# Patient Record
Sex: FEMALE | Race: WHITE | NOT HISPANIC OR LATINO | Employment: FULL TIME | URBAN - METROPOLITAN AREA
[De-identification: names, ages, dates, MRNs, and addresses within clinical notes are randomized per-mention and may not be internally consistent; named-entity substitution may affect disease eponyms.]

---

## 2023-09-20 PROBLEM — F54 PSYCHOLOGICAL FACTORS AFFECTING MORBID OBESITY (MULTI): Status: ACTIVE | Noted: 2023-09-20

## 2023-09-20 PROBLEM — Z98.84 BARIATRIC SURGERY STATUS: Status: ACTIVE | Noted: 2023-09-20

## 2023-09-20 PROBLEM — G47.34 NOCTURNAL HYPOXEMIA: Status: ACTIVE | Noted: 2023-09-20

## 2023-09-20 PROBLEM — R91.1 LUNG NODULE, SOLITARY: Status: ACTIVE | Noted: 2023-09-20

## 2023-09-20 PROBLEM — K91.2 INTESTINAL MALABSORPTION FOLLOWING GASTRECTOMY (HHS-HCC): Status: ACTIVE | Noted: 2023-09-20

## 2023-09-20 PROBLEM — B37.31 CANDIDIASIS OF VAGINA: Status: ACTIVE | Noted: 2023-09-20

## 2023-09-20 PROBLEM — J03.00 STREP TONSILLITIS: Status: ACTIVE | Noted: 2023-09-20

## 2023-09-20 PROBLEM — H61.22 LEFT EAR IMPACTED CERUMEN: Status: ACTIVE | Noted: 2023-09-20
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
## 2023-09-20 PROBLEM — B00.9 HSV-2 INFECTION: Status: ACTIVE | Noted: 2023-09-20

## 2023-09-20 PROBLEM — G89.18 ACUTE POST-OPERATIVE PAIN: Status: ACTIVE | Noted: 2023-09-20

## 2023-09-20 PROBLEM — S39.012A STRAIN OF LUMBAR REGION: Status: ACTIVE | Noted: 2023-09-20

## 2023-09-20 PROBLEM — E78.5 HYPERLIPIDEMIA: Status: ACTIVE | Noted: 2023-09-20

## 2023-09-20 PROBLEM — R63.8 ALTERATION IN NUTRITION: Status: ACTIVE | Noted: 2023-09-20

## 2023-09-20 PROBLEM — N92.1 MENORRHAGIA WITH IRREGULAR CYCLE: Status: ACTIVE | Noted: 2023-09-20

## 2023-09-20 PROBLEM — J11.1 FLU SYNDROME: Status: ACTIVE | Noted: 2023-09-20

## 2023-09-20 PROBLEM — E66.01 PSYCHOLOGICAL FACTORS AFFECTING MORBID OBESITY (MULTI): Status: ACTIVE | Noted: 2023-09-20

## 2023-09-20 PROBLEM — R73.01 IMPAIRED FASTING GLUCOSE: Status: ACTIVE | Noted: 2023-09-20

## 2023-09-20 PROBLEM — E88.810 DYSMETABOLIC SYNDROME: Status: ACTIVE | Noted: 2023-09-20

## 2023-09-20 PROBLEM — L30.9 DERMATITIS, ECZEMATOID: Status: ACTIVE | Noted: 2023-09-20

## 2023-09-20 PROBLEM — I10 ESSENTIAL HYPERTENSION: Status: ACTIVE | Noted: 2023-09-20

## 2023-09-20 PROBLEM — S90.121A CONTUSION OF TOE OF RIGHT FOOT: Status: ACTIVE | Noted: 2023-09-20

## 2023-09-20 PROBLEM — R92.1 BREAST CALCIFICATION, RIGHT: Status: ACTIVE | Noted: 2023-09-20

## 2023-09-20 PROBLEM — H81.10 BPPV (BENIGN PAROXYSMAL POSITIONAL VERTIGO): Status: ACTIVE | Noted: 2023-09-20

## 2023-09-20 PROBLEM — R09.1 PLEURISY: Status: ACTIVE | Noted: 2023-09-20

## 2023-09-20 PROBLEM — R06.02 SOB (SHORTNESS OF BREATH): Status: ACTIVE | Noted: 2023-09-20

## 2023-09-20 PROBLEM — J06.9 ACUTE URI: Status: ACTIVE | Noted: 2023-09-20

## 2023-09-20 PROBLEM — B34.2 CORONAVIRUS INFECTION: Status: ACTIVE | Noted: 2023-09-20

## 2023-09-20 PROBLEM — F31.9 BIPOLAR AFFECTIVE DISORDER (MULTI): Status: ACTIVE | Noted: 2023-09-20

## 2023-09-20 PROBLEM — G47.30 BREATHING-RELATED SLEEP DISORDER: Status: ACTIVE | Noted: 2023-09-20

## 2023-09-20 PROBLEM — E66.01 MORBID OBESITY (MULTI): Status: ACTIVE | Noted: 2023-09-20

## 2023-09-20 PROBLEM — G25.81 RESTLESS LEG SYNDROME: Status: ACTIVE | Noted: 2023-09-20

## 2023-09-20 PROBLEM — G47.00 INSOMNIA: Status: ACTIVE | Noted: 2023-09-20

## 2023-09-20 PROBLEM — R92.8 MAMMOGRAM ABNORMAL: Status: ACTIVE | Noted: 2023-09-20

## 2023-09-20 PROBLEM — R50.9 FEVER AND CHILLS: Status: ACTIVE | Noted: 2023-09-20

## 2023-09-20 PROBLEM — J02.9 SORE THROAT: Status: ACTIVE | Noted: 2023-09-20

## 2023-09-20 PROBLEM — R30.9 PAINFUL URINATION: Status: ACTIVE | Noted: 2023-09-20

## 2023-09-20 PROBLEM — E55.9 VITAMIN D DEFICIENCY: Status: ACTIVE | Noted: 2023-09-20

## 2023-09-20 PROBLEM — K21.9 ESOPHAGEAL REFLUX: Status: ACTIVE | Noted: 2023-09-20

## 2023-09-20 PROBLEM — G43.909 MIGRAINES: Status: ACTIVE | Noted: 2023-09-20

## 2023-09-20 PROBLEM — F41.9 ANXIETY: Status: ACTIVE | Noted: 2023-09-20

## 2023-09-20 PROBLEM — Z90.3 INTESTINAL MALABSORPTION FOLLOWING GASTRECTOMY (HHS-HCC): Status: ACTIVE | Noted: 2023-09-20

## 2023-09-20 PROBLEM — G47.33 OSA (OBSTRUCTIVE SLEEP APNEA): Status: ACTIVE | Noted: 2023-09-20

## 2023-09-20 PROBLEM — S23.9XXA THORACIC SPRAIN: Status: ACTIVE | Noted: 2023-09-20

## 2023-09-20 PROBLEM — F32.A DEPRESSION: Status: ACTIVE | Noted: 2023-09-20

## 2023-09-20 PROBLEM — U07.1 COVID-19: Status: ACTIVE | Noted: 2023-09-20

## 2023-09-20 PROBLEM — M54.14 THORACIC RADICULITIS: Status: ACTIVE | Noted: 2023-09-20

## 2023-09-20 PROBLEM — M54.9 BACK PAIN: Status: ACTIVE | Noted: 2023-09-20

## 2023-09-20 RX ORDER — LURASIDONE HYDROCHLORIDE 60 MG/1
1 TABLET, FILM COATED ORAL EVERY EVENING
COMMUNITY
Start: 2022-01-05 | End: 2023-12-19 | Stop reason: WASHOUT

## 2023-09-20 RX ORDER — TIZANIDINE HYDROCHLORIDE 4 MG/1
CAPSULE, GELATIN COATED ORAL
COMMUNITY
Start: 2022-07-29 | End: 2024-02-02 | Stop reason: SDUPTHER

## 2023-09-20 RX ORDER — OMEPRAZOLE 40 MG/1
40 CAPSULE, DELAYED RELEASE ORAL DAILY
COMMUNITY
End: 2024-02-02 | Stop reason: SDUPTHER

## 2023-09-20 RX ORDER — METRONIDAZOLE 7.5 MG/G
GEL VAGINAL
COMMUNITY
Start: 2023-06-09 | End: 2023-12-19 | Stop reason: WASHOUT

## 2023-09-20 RX ORDER — TRAZODONE HYDROCHLORIDE 100 MG/1
TABLET ORAL
COMMUNITY
Start: 2019-05-29 | End: 2023-12-19 | Stop reason: SDUPTHER

## 2023-09-20 RX ORDER — SULFAMETHOXAZOLE AND TRIMETHOPRIM 800; 160 MG/1; MG/1
1 TABLET ORAL 2 TIMES DAILY
COMMUNITY
Start: 2022-10-28 | End: 2024-02-02 | Stop reason: WASHOUT

## 2023-09-20 RX ORDER — RIZATRIPTAN BENZOATE 10 MG/1
TABLET ORAL
COMMUNITY
Start: 2022-02-04

## 2023-09-20 RX ORDER — PHENOL 1.4 %
AEROSOL, SPRAY (ML) MUCOUS MEMBRANE
COMMUNITY
Start: 2019-07-03

## 2023-09-20 RX ORDER — LISINOPRIL 20 MG/1
TABLET ORAL
COMMUNITY
End: 2023-12-19 | Stop reason: WASHOUT

## 2023-09-20 RX ORDER — LURASIDONE HYDROCHLORIDE 20 MG/1
TABLET, FILM COATED ORAL
COMMUNITY
Start: 2022-01-05 | End: 2023-12-19 | Stop reason: WASHOUT

## 2023-09-20 RX ORDER — TRAZODONE HYDROCHLORIDE 50 MG/1
TABLET ORAL 2 TIMES DAILY
COMMUNITY
End: 2023-12-19 | Stop reason: WASHOUT

## 2023-09-20 RX ORDER — PENICILLIN V POTASSIUM 500 MG/1
TABLET, FILM COATED ORAL 2 TIMES DAILY
COMMUNITY
Start: 2022-11-22 | End: 2023-12-19 | Stop reason: WASHOUT

## 2023-09-20 RX ORDER — VALACYCLOVIR HYDROCHLORIDE 1 G/1
1 TABLET, FILM COATED ORAL 3 TIMES DAILY
COMMUNITY
Start: 2018-04-18

## 2023-11-09 ENCOUNTER — APPOINTMENT (OUTPATIENT)
Dept: BEHAVIORAL HEALTH | Facility: CLINIC | Age: 53
End: 2023-11-09
Payer: COMMERCIAL

## 2023-12-19 ENCOUNTER — TELEMEDICINE (OUTPATIENT)
Dept: BEHAVIORAL HEALTH | Facility: CLINIC | Age: 53
End: 2023-12-19
Payer: COMMERCIAL

## 2023-12-19 DIAGNOSIS — G47.00 INSOMNIA, UNSPECIFIED TYPE: ICD-10-CM

## 2023-12-19 DIAGNOSIS — F32.A DEPRESSION, UNSPECIFIED DEPRESSION TYPE: ICD-10-CM

## 2023-12-19 DIAGNOSIS — F31.31 BIPOLAR AFFECTIVE DISORDER, CURRENTLY DEPRESSED, MILD (MULTI): ICD-10-CM

## 2023-12-19 PROCEDURE — 99212 OFFICE O/P EST SF 10 MIN: CPT | Performed by: CLINICAL NURSE SPECIALIST

## 2023-12-19 RX ORDER — LURASIDONE HYDROCHLORIDE 80 MG/1
80 TABLET, FILM COATED ORAL EVERY EVENING
COMMUNITY
Start: 2023-08-03 | End: 2023-12-19 | Stop reason: SDUPTHER

## 2023-12-19 RX ORDER — TRAZODONE HYDROCHLORIDE 100 MG/1
200 TABLET ORAL NIGHTLY
Qty: 180 TABLET | Refills: 0 | Status: SHIPPED | OUTPATIENT
Start: 2023-12-19 | End: 2024-02-23 | Stop reason: SDUPTHER

## 2023-12-19 RX ORDER — LURASIDONE HYDROCHLORIDE 80 MG/1
80 TABLET, FILM COATED ORAL EVERY EVENING
Qty: 90 TABLET | Refills: 0 | Status: SHIPPED | OUTPATIENT
Start: 2023-12-19 | End: 2024-02-23 | Stop reason: SDUPTHER

## 2023-12-19 RX ORDER — BUPROPION HYDROCHLORIDE 150 MG/1
150 TABLET ORAL EVERY MORNING
Qty: 30 TABLET | Refills: 1 | Status: SHIPPED | OUTPATIENT
Start: 2023-12-19 | End: 2024-02-19 | Stop reason: SDUPTHER

## 2023-12-19 NOTE — PROGRESS NOTES
Outpatient Psychiatry      Subjective   Yamile Cook, a 53 y.o. female, presents for follow up for a virtual appointment for an established patient for a  medication management /outpatient psychiatry appointment       Diagnosis:         Anxiety (300.00) (F41.9)   · Depression (311) (F32.A)         Insomnia (780.52) (G47.00)   · Bipolar affective disorder (296.80) (F31.9)      Treatment Goals:  Specify outcomes written in observable, behavioral terms:   Maintain stability of mental health and adhere to treatment components , continue psychotherapy     Treatment Plan/Recommendations:  can follow up for meds in 1 month,can can continue self care and wellness efforts and maintain routine health screenings , can call  for treatment concerns   Follow-up plan  was discussed with patient.    Review with patient: Treatment plan reviewed with the patient.  Medication risks/benefit reviewed with the patient    HPI:patient has good coping skills , and good insight   no side effect concerns   has increased mood concerns with low mood with recent increased stressors , discussed the potential for eps and td as adverse effects , also discussed the potential for increased appetite and elevated blood sugar , elevated cholesterol and weight gain   she does not have any concerns with weight gain   reviewed therapy note in the [facility] emr for supportive therapy due to anxiety and depression symptoms and most recent internal medicine note in the [facility] for health maintenance , reconciled and reviewed medication list in the [facility] emr   support provided       Current Outpatient Medications:     lisinopril 20 mg tablet, Take by mouth., Disp: , Rfl:     lurasidone (Latuda) 20 mg tablet, Take by mouth., Disp: , Rfl:     lurasidone (Latuda) 60 mg tablet, Take 1 tablet (60 mg) by mouth once daily in the evening., Disp: , Rfl:     metroNIDAZOLE (Metrogel) 0.75 % (37.5mg/5 gram) vaginal gel, , Disp: , Rfl:     multivitamin with minerals  (Adults Multivitamin) tablet, Take by mouth., Disp: , Rfl:     multivitamin with minerals (multivitamin-iron-folic acid) tablet, Take by mouth., Disp: , Rfl:     omeprazole (PriLOSEC) 40 mg DR capsule, Take 1 capsule (40 mg) by mouth once daily., Disp: , Rfl:     penicillin v potassium (Veetid) 500 mg tablet, Take by mouth twice a day., Disp: , Rfl:     rizatriptan (Maxalt) 10 mg tablet, Take by mouth., Disp: , Rfl:     sulfamethoxazole-trimethoprim (Bactrim DS) 800-160 mg tablet, Take 1 tablet by mouth 2 times a day., Disp: , Rfl:     tiZANidine (Zanaflex) 4 mg capsule, Take by mouth., Disp: , Rfl:     traZODone (Desyrel) 100 mg tablet, Take by mouth., Disp: , Rfl:     traZODone (Desyrel) 50 mg tablet, Take by mouth twice a day., Disp: , Rfl:     valACYclovir (Valtrex) 1 gram tablet, Take 1 tablet (1,000 mg) by mouth 3 times a day., Disp: , Rfl:   Medical History:  Past Medical History:   Diagnosis Date    Encounter for other preprocedural examination 03/09/2020    Pre-operative clearance    Other conditions influencing health status     Nulliparity    Other specified disorders of nose and nasal sinuses 04/01/2016    Sinus pressure    Personal history of other diseases of the digestive system 01/28/2014    History of gastroesophageal reflux (GERD)    Personal history of other infectious and parasitic diseases 01/22/2020    History of herpes zoster    Personal history of other mental and behavioral disorders 01/28/2014    History of bipolar disorder     Surgical History:  Past Surgical History:   Procedure Laterality Date    OTHER SURGICAL HISTORY  12/10/2020    Gastric bypass surgery     Family History:  No family history on file.  Social History:  Social History     Socioeconomic History    Marital status: Single     Spouse name: Not on file    Number of children: Not on file    Years of education: Not on file    Highest education level: Not on file   Occupational History    Not on file   Tobacco Use    Smoking  status: Not on file    Smokeless tobacco: Not on file   Substance and Sexual Activity    Alcohol use: Not on file    Drug use: Not on file    Sexual activity: Not on file   Other Topics Concern    Not on file   Social History Narrative    Not on file     Social Determinants of Health     Financial Resource Strain: Not on file   Food Insecurity: Not on file   Transportation Needs: Not on file   Physical Activity: Not on file   Stress: Not on file   Social Connections: Not on file   Intimate Partner Violence: Not on file   Housing Stability: Not on file     Record Review: brief    Vitals:  There were no vitals filed for this visit.    Ceci Berry, APRN-CNS

## 2023-12-22 ENCOUNTER — TELEMEDICINE (OUTPATIENT)
Dept: BEHAVIORAL HEALTH | Facility: CLINIC | Age: 53
End: 2023-12-22
Payer: COMMERCIAL

## 2023-12-22 DIAGNOSIS — F33.0 MILD EPISODE OF RECURRENT MAJOR DEPRESSIVE DISORDER (CMS-HCC): ICD-10-CM

## 2023-12-22 DIAGNOSIS — F41.9 ANXIETY AND DEPRESSION: ICD-10-CM

## 2023-12-22 DIAGNOSIS — F41.9 ANXIETY: ICD-10-CM

## 2023-12-22 DIAGNOSIS — F32.A ANXIETY AND DEPRESSION: ICD-10-CM

## 2023-12-22 PROCEDURE — 90837 PSYTX W PT 60 MINUTES: CPT | Performed by: PSYCHOLOGIST

## 2023-12-23 NOTE — PROGRESS NOTES
"6 PM 16098 Indiv Psych for Depression and Anxiety (r/o Bipolar) (60 MIN, 355-435)  Virtual. Supportive Therapy. Not seen since July, at that time, new job, insurance lag, reached out to schedule now that job/insurance is secure. Started new job Sept 18, there 3 months. Job going well, has a lot to learn, given some feedback about what she needs to improve on. Spent most of time discussing situation with mother (82). 1.5 months ago, mom fell from foot stool, unknown at times, cracked some ribs. Patient found her with heating pad on her chest. Planned an outing with her, mom having pain from ribs, fell trying to brace self, fell in bathroom. Reached out to brother and DAVID to help (DAVID is nurse by training). Brother came and helped, called EMS, taken to [facility]. Had broken her femur, immediate surgery with pins/rods in. Went to [facility] rehab, wasn't doing well. Consulted with [place]. Mom was on oxygen, found to have pulmonary edema. He recommended she be back to hospital, went to [facility], determined to have blood clots in lungs and legs. Had had some prior to fall. Was in hospital a week, on blood thinner. Now back at [facility]. Hadn't been pushing with therapies to get better, participate in PT and other therapies to get better. [place] paying for an aide. Patient has been caring for their dog, brother (POA) handling her finances. Since mom owns condo they live in, more anxious about future, worries about being \"homeless\" and would have difficulty securing housing/apartment with current credit rating. Has been overwhelmed, visiting mother five days/week but feels bad, mom making her feel guilty on days she doesn't come, telling her she's not making mom a \"priority.\" Frustrated by this. Working full-time in new job, also seeing Richi marte, one night/week. Never felt like current home is her's, concerns if mom is forced to stay at [facility], she'll lose security/residence. \"Never relax, always " "stressed.\" Some of this comes with uncertainty of future. Discussed importance of her carving off some more protected time for self. Started bupropion recently and taking trazadone for sleep. \"So beyond stressed.\" Said bf has been supportive. She melted down a few times emotionally and he wasn't sure how to react. Mother's past few days in rehab have been good, mother actively working. Still on oxygen for most part. Brother meets with  at [facility] Dec 27. Can't afford to stay in condo on own or make $1400/month payment. Feels brother and DAVID would \"wash hands of me\" if mom dies, estranged somewhat since DAVID had cancer and she began dating Richi (who's younger than her). Wants to meet more routinely. Next: 2-3 weeks.   "

## 2024-01-12 ENCOUNTER — TELEMEDICINE (OUTPATIENT)
Dept: BEHAVIORAL HEALTH | Facility: CLINIC | Age: 54
End: 2024-01-12
Payer: COMMERCIAL

## 2024-01-12 DIAGNOSIS — F41.9 ANXIETY AND DEPRESSION: ICD-10-CM

## 2024-01-12 DIAGNOSIS — F32.A ANXIETY AND DEPRESSION: ICD-10-CM

## 2024-01-12 PROCEDURE — 90837 PSYTX W PT 60 MINUTES: CPT | Performed by: PSYCHOLOGIST

## 2024-01-13 NOTE — PROGRESS NOTES
8 PM 43649 Indiv Psych for Depression and Anxiety (r/o Bipolar) (60 MIN, [phone])  Virtual. Supportive Therapy. Assertive with mother about visiting rehab/nursing home. Mom got covid, taken to [facility], respiratory problems. Now back at [facility]. Said mom now has positive attitude, engaging PT and OT more aggressively. Hopes she gets out. Patient now doing debt consolidation plan, feeling good about this, will take 4 years. Also, looking into condo, since there 14 years with mom, even though in mom's name, may still be able to remain there and worst case, if mom remains in [facility], might be entitled to not lose with switch to medicaid. Now setting aside 650/pay check to pay mortgage/maintenance fee. Said mom set her up for failure. Mom is a spender, blew through inheritance, could have transferred some assets, including home to her and brother. Patient plans on reducing spending to necessities. Also, finding other places to cut costs (e.g., mom's auto insurance, cable, etc). Feels antidepressant, bupropion helping. Discussed relationship with Richi. Wants him to advocate more for self in jobs. Next: 2 weeks.

## 2024-01-15 ENCOUNTER — ANCILLARY PROCEDURE (OUTPATIENT)
Dept: RADIOLOGY | Facility: CLINIC | Age: 54
End: 2024-01-15
Payer: COMMERCIAL

## 2024-01-15 VITALS — WEIGHT: 164.9 LBS | BODY MASS INDEX: 33.24 KG/M2 | HEIGHT: 59 IN

## 2024-01-15 DIAGNOSIS — Z12.31 SCREENING MAMMOGRAM FOR BREAST CANCER: ICD-10-CM

## 2024-01-15 PROCEDURE — 77067 SCR MAMMO BI INCL CAD: CPT

## 2024-01-15 PROCEDURE — 77063 BREAST TOMOSYNTHESIS BI: CPT | Performed by: STUDENT IN AN ORGANIZED HEALTH CARE EDUCATION/TRAINING PROGRAM

## 2024-01-15 PROCEDURE — 77067 SCR MAMMO BI INCL CAD: CPT | Performed by: STUDENT IN AN ORGANIZED HEALTH CARE EDUCATION/TRAINING PROGRAM

## 2024-01-26 ENCOUNTER — TELEMEDICINE (OUTPATIENT)
Dept: BEHAVIORAL HEALTH | Facility: CLINIC | Age: 54
End: 2024-01-26
Payer: COMMERCIAL

## 2024-01-26 DIAGNOSIS — F32.A ANXIETY AND DEPRESSION: ICD-10-CM

## 2024-01-26 DIAGNOSIS — F41.9 ANXIETY AND DEPRESSION: ICD-10-CM

## 2024-01-26 PROCEDURE — 90837 PSYTX W PT 60 MINUTES: CPT | Performed by: PSYCHOLOGIST
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
## 2024-02-02 ENCOUNTER — OFFICE VISIT (OUTPATIENT)
Dept: PRIMARY CARE | Facility: CLINIC | Age: 54
End: 2024-02-02
Payer: COMMERCIAL

## 2024-02-02 VITALS
WEIGHT: 166 LBS | HEIGHT: 59 IN | SYSTOLIC BLOOD PRESSURE: 122 MMHG | HEART RATE: 80 BPM | DIASTOLIC BLOOD PRESSURE: 70 MMHG | BODY MASS INDEX: 33.47 KG/M2

## 2024-02-02 DIAGNOSIS — M54.9 CHRONIC BACK PAIN, UNSPECIFIED BACK LOCATION, UNSPECIFIED BACK PAIN LATERALITY: Primary | ICD-10-CM

## 2024-02-02 DIAGNOSIS — F41.9 ANXIETY: ICD-10-CM

## 2024-02-02 DIAGNOSIS — K21.9 GASTROESOPHAGEAL REFLUX DISEASE, UNSPECIFIED WHETHER ESOPHAGITIS PRESENT: ICD-10-CM

## 2024-02-02 DIAGNOSIS — F32.A DEPRESSION, UNSPECIFIED DEPRESSION TYPE: ICD-10-CM

## 2024-02-02 DIAGNOSIS — B00.9 HSV-2 INFECTION: ICD-10-CM

## 2024-02-02 DIAGNOSIS — M41.9 SCOLIOSIS, UNSPECIFIED SCOLIOSIS TYPE, UNSPECIFIED SPINAL REGION: ICD-10-CM

## 2024-02-02 DIAGNOSIS — G89.29 CHRONIC BACK PAIN, UNSPECIFIED BACK LOCATION, UNSPECIFIED BACK PAIN LATERALITY: Primary | ICD-10-CM

## 2024-02-02 DIAGNOSIS — G43.809 OTHER MIGRAINE WITHOUT STATUS MIGRAINOSUS, NOT INTRACTABLE: ICD-10-CM

## 2024-02-02 DIAGNOSIS — F31.31 BIPOLAR AFFECTIVE DISORDER, CURRENTLY DEPRESSED, MILD (MULTI): ICD-10-CM

## 2024-02-02 PROBLEM — J11.1 FLU SYNDROME: Status: RESOLVED | Noted: 2023-09-20 | Resolved: 2024-02-02

## 2024-02-02 PROBLEM — R87.619 ABNORMAL PAP SMEAR OF CERVIX: Status: ACTIVE | Noted: 2024-02-02

## 2024-02-02 PROBLEM — H61.22 IMPACTED CERUMEN OF LEFT EAR: Status: RESOLVED | Noted: 2023-09-20 | Resolved: 2024-02-02

## 2024-02-02 PROBLEM — J06.9 ACUTE UPPER RESPIRATORY INFECTION: Status: RESOLVED | Noted: 2023-09-20 | Resolved: 2024-02-02

## 2024-02-02 PROBLEM — H61.22 LEFT EAR IMPACTED CERUMEN: Status: RESOLVED | Noted: 2023-09-20 | Resolved: 2024-02-02

## 2024-02-02 PROBLEM — Z86.59 HISTORY OF MANIC DEPRESSIVE DISORDER: Status: ACTIVE | Noted: 2024-02-02

## 2024-02-02 PROBLEM — J11.1 INFLUENZA: Status: RESOLVED | Noted: 2023-09-20 | Resolved: 2024-02-02

## 2024-02-02 PROCEDURE — 1036F TOBACCO NON-USER: CPT | Performed by: INTERNAL MEDICINE

## 2024-02-02 PROCEDURE — 99214 OFFICE O/P EST MOD 30 MIN: CPT | Performed by: INTERNAL MEDICINE

## 2024-02-02 PROCEDURE — 3074F SYST BP LT 130 MM HG: CPT | Performed by: INTERNAL MEDICINE

## 2024-02-02 PROCEDURE — 3078F DIAST BP <80 MM HG: CPT | Performed by: INTERNAL MEDICINE

## 2024-02-02 RX ORDER — OMEPRAZOLE 40 MG/1
40 CAPSULE, DELAYED RELEASE ORAL DAILY
Qty: 90 CAPSULE | Refills: 3 | Status: SHIPPED | OUTPATIENT
Start: 2024-02-02

## 2024-02-02 RX ORDER — MELOXICAM 7.5 MG/1
7.5 TABLET ORAL DAILY PRN
Qty: 90 TABLET | Refills: 0 | Status: SHIPPED | OUTPATIENT
Start: 2024-02-02 | End: 2025-02-01

## 2024-02-02 RX ORDER — GLUCOSAMINE/CHONDR SU A SOD 750-600 MG
TABLET ORAL
COMMUNITY

## 2024-02-02 RX ORDER — FLUTICASONE PROPIONATE 50 MCG
1 SPRAY, SUSPENSION (ML) NASAL 2 TIMES DAILY
COMMUNITY
Start: 2022-11-22 | End: 2024-06-04 | Stop reason: WASHOUT

## 2024-02-02 RX ORDER — PNV NO.95/FERROUS FUM/FOLIC AC 28MG-0.8MG
TABLET ORAL
COMMUNITY

## 2024-02-02 RX ORDER — TIZANIDINE HYDROCHLORIDE 4 MG/1
4 CAPSULE, GELATIN COATED ORAL NIGHTLY PRN
Qty: 90 CAPSULE | Refills: 0 | Status: SHIPPED | OUTPATIENT
Start: 2024-02-02

## 2024-02-02 RX ORDER — CHOLECALCIFEROL (VITAMIN D3) 125 MCG
CAPSULE ORAL
COMMUNITY

## 2024-02-02 ASSESSMENT — ENCOUNTER SYMPTOMS
DEPRESSION: 0
LOSS OF SENSATION IN FEET: 0
OCCASIONAL FEELINGS OF UNSTEADINESS: 0

## 2024-02-02 NOTE — PROGRESS NOTES
"Subjective   Patient ID: Yamile Cook is a 53 y.o. female who presents for Back Pain.    HPI   Patient is a 53-year-old  female who comes today with some acute issues to discuss.  Her list of medications was reviewed and updated in the medical record and she has been compliant with them.  Patient does not report any side effects to medications.  She follows up with her psychiatrist in regards to her history of bipolar disorder as well as anxiety.  She has been dealing with some dental issues for which she is seeing her dentist and did have a root canal with dental extraction not too long ago.  Patient did not get any other labs done as advised during her annual wellness exam in July 2023.  Her acid reflux is stable on omeprazole and she requests a refill today.  Patient has history of scoliosis with chronic low back pain and this has gotten worse lately and she is struggling with pain on a daily basis.  Patient claims that the pain is nonradiating.  She denies fever, chills, chest pain, shortness of breath, cough, abdominal pain, nausea, vomiting, diarrhea, constipation, dysuria, hematuria, urinary incontinence, tingling, numbness or weakness.  Review of Systems  As per [facility]  Objective   /70 (BP Location: Right arm, Patient Position: Sitting, BP Cuff Size: Adult)   Pulse 80   Ht 1.499 m (4' 11\")   Wt 75.3 kg (166 lb)   LMP 05/04/2023   BMI 33.53 kg/m²     Physical Exam  General - Well developed, well appearing, obese middle-aged  female in no acute respiratory distress  Eyes - normal conjunctiva with no pallor or icterus, normal extraocular movements  Neck - No JVD, thyromegaly or lymphadenopathy  Lungs - no respiratory distress and lungs clear to auscultation bilaterally with no rales or wheezes  Heart - normal S1, S2 with normal heart rate, rhythm and no murmurs   Abdomen - obese, soft, nontender with no masses or organomegaly,  Extremities - no cyanosis or pedal edema  Neuro - grossly " normal neuro exam with no focal neuro deficits  Psych - normal mental status, mood and affect   Skin - no rashes or ulcers  MSK - normal gait with grossly normal ROM of major joints,   Assessment/Plan       1.  History of scoliosis with chronic nonradiating low back pain-patient will be referred to PT as well as pain management, I will start her on a low-dose of meloxicam 7.5 mg daily which I have advised her to take only on a as needed basis with food, tizanidine 4 mg nightly also on a as needed basis will be refilled and patient is aware of sedative side effects  2.  GERD-refill provided for omeprazole  3.  History of HSV-2 infection-currently stable, patient takes Valtrex as needed for flareups  4.  Migraines-stable and patient claims she has to take her triptan rarely  5.  History of bipolar disorder, anxiety-patient is following up with her psychiatrist and will take medications as prescribed by them.  Follow-up in July 2024 for annual wellness exam and lab work.  30 minutes spent rooming the patient, reviewing records, eliciting history, examining patient, counseling, coordination of care and in documentation.  This note was partially generated using the Dragon voice recognition system. There may be some incorrect words, spelling and punctuation errors that were not corrected prior to committing the note to the patient's medical record.

## 2024-02-09 ENCOUNTER — TELEMEDICINE (OUTPATIENT)
Dept: BEHAVIORAL HEALTH | Facility: CLINIC | Age: 54
End: 2024-02-09
Payer: COMMERCIAL

## 2024-02-09 DIAGNOSIS — F32.A ANXIETY AND DEPRESSION: ICD-10-CM

## 2024-02-09 DIAGNOSIS — F41.9 ANXIETY AND DEPRESSION: ICD-10-CM

## 2024-02-09 PROCEDURE — 1036F TOBACCO NON-USER: CPT | Performed by: PSYCHOLOGIST

## 2024-02-09 PROCEDURE — 90837 PSYTX W PT 60 MINUTES: CPT | Performed by: PSYCHOLOGIST
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
## 2024-02-19 ENCOUNTER — DOCUMENTATION (OUTPATIENT)
Dept: BEHAVIORAL HEALTH | Facility: CLINIC | Age: 54
End: 2024-02-19
Payer: COMMERCIAL

## 2024-02-19 DIAGNOSIS — F32.A DEPRESSION, UNSPECIFIED DEPRESSION TYPE: ICD-10-CM

## 2024-02-19 RX ORDER — BUPROPION HYDROCHLORIDE 150 MG/1
150 TABLET ORAL EVERY MORNING
Qty: 90 TABLET | Refills: 0 | Status: SHIPPED | OUTPATIENT
Start: 2024-02-19 | End: 2024-02-23 | Stop reason: SDUPTHER

## 2024-02-19 NOTE — PROGRESS NOTES
Nonbillable note : reviewed chart [facility] emr and sent 90 day script for bupropion xl to the pharmacy pr patient request [facility]

## 2024-02-23 ENCOUNTER — DOCUMENTATION (OUTPATIENT)
Dept: BEHAVIORAL HEALTH | Facility: CLINIC | Age: 54
End: 2024-02-23

## 2024-02-23 ENCOUNTER — TELEMEDICINE (OUTPATIENT)
Dept: BEHAVIORAL HEALTH | Facility: CLINIC | Age: 54
End: 2024-02-23
Payer: COMMERCIAL

## 2024-02-23 DIAGNOSIS — F31.9 BIPOLAR AFFECTIVE DISORDER, REMISSION STATUS UNSPECIFIED (MULTI): ICD-10-CM

## 2024-02-23 DIAGNOSIS — F41.9 ANXIETY: ICD-10-CM

## 2024-02-23 DIAGNOSIS — G47.00 INSOMNIA, UNSPECIFIED TYPE: ICD-10-CM

## 2024-02-23 DIAGNOSIS — F32.A DEPRESSION, UNSPECIFIED DEPRESSION TYPE: ICD-10-CM

## 2024-02-23 DIAGNOSIS — F41.8 DEPRESSION WITH ANXIETY: ICD-10-CM

## 2024-02-23 PROCEDURE — 99213 OFFICE O/P EST LOW 20 MIN: CPT | Performed by: CLINICAL NURSE SPECIALIST

## 2024-02-23 PROCEDURE — 1036F TOBACCO NON-USER: CPT | Performed by: PSYCHOLOGIST

## 2024-02-23 PROCEDURE — 1036F TOBACCO NON-USER: CPT | Performed by: CLINICAL NURSE SPECIALIST

## 2024-02-23 PROCEDURE — 90837 PSYTX W PT 60 MINUTES: CPT | Performed by: PSYCHOLOGIST

## 2024-02-23 RX ORDER — BUPROPION HYDROCHLORIDE 150 MG/1
150 TABLET ORAL EVERY MORNING
Qty: 90 TABLET | Refills: 1 | Status: SHIPPED | OUTPATIENT
Start: 2024-02-23 | End: 2024-02-23 | Stop reason: SDUPTHER

## 2024-02-23 RX ORDER — BUPROPION HYDROCHLORIDE 150 MG/1
150 TABLET ORAL EVERY MORNING
Qty: 90 TABLET | Refills: 1 | Status: SHIPPED | OUTPATIENT
Start: 2024-02-23 | End: 2024-05-23

## 2024-02-23 RX ORDER — LURASIDONE HYDROCHLORIDE 80 MG/1
80 TABLET, FILM COATED ORAL EVERY EVENING
Qty: 90 TABLET | Refills: 1 | Status: SHIPPED | OUTPATIENT
Start: 2024-02-23 | End: 2024-05-23

## 2024-02-23 RX ORDER — TRAZODONE HYDROCHLORIDE 100 MG/1
200 TABLET ORAL NIGHTLY
Qty: 180 TABLET | Refills: 1 | Status: SHIPPED | OUTPATIENT
Start: 2024-02-23 | End: 2024-05-23

## 2024-02-23 NOTE — PROGRESS NOTES
Nonbillable note : got message from system that bupropion xl did not go to pharmacy ,resent script electronically [facility]

## 2024-02-23 NOTE — PROGRESS NOTES
Outpatient Psychiatry      Subjective     Yamile Cook, a 53 y.o. female, presents for follow up for a virtual appointment for an established patient for a  medication management /outpatient psychiatry appointment       Diagnosis:         Anxiety (300.00) (F41.9)   · Depression unspecified depression type         Insomnia unspecified type    · Bipolar affective disorder remission status unspecified     Patient Active Problem List   Diagnosis    Acute post-operative pain    Acute URI    Alteration in nutrition    Anxiety    Back pain    Bariatric surgery status    BPPV (benign paroxysmal positional vertigo)    Breast calcification, right    Candidiasis of vagina    Contusion of toe of right foot    Coronavirus infection    COVID-19    Bipolar affective disorder (CMS/HCC)    Depression    Dermatitis, eczematoid    Dysmetabolic syndrome    Esophageal reflux    Essential hypertension    Fall, accidental    Fever and chills    HSV-2 infection    Hyperlipidemia    Impaired fasting glucose    Insomnia    Intestinal malabsorption following gastrectomy    Lung nodule, solitary    Mammogram abnormal    Menorrhagia with irregular cycle    Migraines    Morbid obesity (CMS/HCC)    Nocturnal hypoxemia    Breathing-related sleep disorder    FRANCE (obstructive sleep apnea)    Painful urination    Pleurisy    Psychological factors affecting morbid obesity (CMS/HCC)    Restless leg syndrome    SOB (shortness of breath)    Sore throat    Strain of lumbar region    Strep tonsillitis    Thoracic radiculitis    Thoracic sprain    Vitamin D deficiency    Abnormal Pap smear of cervix    History of manic depressive disorder     Treatment Plan/Recommendations: can follow up for meds in 5-6 months ,can can continue self care and wellness efforts and maintain routine health screenings , can call  for treatment concerns   Follow-up plan was discussed with patient.    Review with patient: Treatment plan reviewed with the  patient.  Medication risks/benefit reviewed with the patient    HPI:patient has good coping skills , and good insight   no side effect concerns   reviewed therapy note in the [facility] emr for supportive therapy due to anxiety and depression symptoms and most recent internal medicine note in the [facility] for health maintenance , reconciled and reviewed medication list in the [facility] emr   support provided for stressors       Medical History:  Past Medical History:   Diagnosis Date    Acute upper respiratory infection 09/20/2023    Encounter for other preprocedural examination 03/09/2020    Pre-operative clearance    Impacted cerumen of left ear 09/20/2023    Influenza 09/20/2023    Other conditions influencing health status     Nulliparity    Other specified disorders of nose and nasal sinuses 04/01/2016    Sinus pressure    Personal history of other diseases of the digestive system 01/28/2014    History of gastroesophageal reflux (GERD)    Personal history of other infectious and parasitic diseases 01/22/2020    History of herpes zoster    Personal history of other mental and behavioral disorders 01/28/2014    History of bipolar disorder     Surgical History:  Past Surgical History:   Procedure Laterality Date    MULTIPLE TOOTH EXTRACTIONS      single    OTHER SURGICAL HISTORY  12/10/2020    Gastric bypass surgery     Family History:  No family history on file.  Social History:  Social History     Socioeconomic History    Marital status: Single     Spouse name: Not on file    Number of children: Not on file    Years of education: Not on file    Highest education level: Not on file   Occupational History    Not on file   Tobacco Use    Smoking status: Former     Types: Cigarettes    Smokeless tobacco: Never   Substance and Sexual Activity    Alcohol use: Yes    Drug use: Never    Sexual activity: Not on file   Other Topics Concern    Not on file   Social History Narrative    Not on file     Social Determinants of Health      Financial Resource Strain: Not on file   Food Insecurity: Not on file   Transportation Needs: Not on file   Physical Activity: Not on file   Stress: Not on file   Social Connections: Not on file   Intimate Partner Violence: Not on file   Housing Stability: Not on file     Record Review: brief     Vitals:  There were no vitals filed for this visit.    Ceci Berry, APRN-CNS
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
## 2024-04-05 ENCOUNTER — TELEMEDICINE (OUTPATIENT)
Dept: BEHAVIORAL HEALTH | Facility: CLINIC | Age: 54
End: 2024-04-05
Payer: COMMERCIAL

## 2024-04-05 DIAGNOSIS — F41.9 ANXIETY AND DEPRESSION: ICD-10-CM

## 2024-04-05 DIAGNOSIS — F32.A ANXIETY AND DEPRESSION: ICD-10-CM

## 2024-04-05 PROCEDURE — 90837 PSYTX W PT 60 MINUTES: CPT | Performed by: PSYCHOLOGIST

## 2024-04-08 NOTE — PROGRESS NOTES
7 PM 44008 Indiv Psych for Depression and Anxiety (dx'ed  Bipolar in past) (60 MIN, 702-336)  Virtual. Supportive Therapy. Busy week. Boss now gone for knee replacement, has to do more on own. Patient managing ok with mom, still has help coming in Mondays and Fridays, DAVID comes on Thursdays. Now in charge of compliance duties at work. Relationship with Richi going OK. Got together with some of his friends. Bf looking for another job, still having migraines. Doing well managing finances. Next: 1 month.

## 2024-05-03 ENCOUNTER — TELEMEDICINE (OUTPATIENT)
Dept: BEHAVIORAL HEALTH | Facility: CLINIC | Age: 54
End: 2024-05-03
Payer: COMMERCIAL

## 2024-05-03 DIAGNOSIS — F32.A ANXIETY AND DEPRESSION: ICD-10-CM

## 2024-05-03 DIAGNOSIS — F41.9 ANXIETY AND DEPRESSION: ICD-10-CM

## 2024-05-03 PROCEDURE — 90837 PSYTX W PT 60 MINUTES: CPT | Performed by: PSYCHOLOGIST
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
## 2024-06-04 ENCOUNTER — OFFICE VISIT (OUTPATIENT)
Dept: OBSTETRICS AND GYNECOLOGY | Facility: CLINIC | Age: 54
End: 2024-06-04
Payer: COMMERCIAL

## 2024-06-04 VITALS
DIASTOLIC BLOOD PRESSURE: 78 MMHG | WEIGHT: 152 LBS | BODY MASS INDEX: 30.64 KG/M2 | HEIGHT: 59 IN | SYSTOLIC BLOOD PRESSURE: 126 MMHG

## 2024-06-04 DIAGNOSIS — N95.1 MENOPAUSAL SYMPTOM: Primary | ICD-10-CM

## 2024-06-04 DIAGNOSIS — N95.1 PERI-MENOPAUSAL: ICD-10-CM

## 2024-06-04 PROCEDURE — 99213 OFFICE O/P EST LOW 20 MIN: CPT | Performed by: OBSTETRICS & GYNECOLOGY

## 2024-06-04 PROCEDURE — 3078F DIAST BP <80 MM HG: CPT | Performed by: OBSTETRICS & GYNECOLOGY

## 2024-06-04 PROCEDURE — 3074F SYST BP LT 130 MM HG: CPT | Performed by: OBSTETRICS & GYNECOLOGY

## 2024-06-04 PROCEDURE — 1036F TOBACCO NON-USER: CPT | Performed by: OBSTETRICS & GYNECOLOGY

## 2024-06-04 NOTE — PROGRESS NOTES
"  Subjective   Patient ID: Yamile Cook is a 53 y.o. female who presents for Menopause.    Patient states her cycles now are irregular.  Her last cycle was in March and lasted a day  No bleeding since  Is not having trouble sleeping as she is on trazodone and other medications  Does have a psychiatrist to help manage her bipolar disease    Patient states her mood is all over the place  Having mild hot flashes but nothing at night  Advise she follow-up with her psychiatrist in regards to her medications to possibly alter her doses  Lifestyle changes including exercise and soy in diet reviewed  Would like to hold off on HRT for now    ROS  All Normal Review of Systems  Constitutional: no fever, no chills, no recent weight gain, no recent weight loss and no fatigue.    Gastrointestinal: no abdominal pain, no constipation, no nausea, no diarrhea and no vomiting.    Genitourinary: no dysuria, no urinary incontinence, no vaginal dryness, no vaginal itching, no dyspareunia, no pelvic pain, no dysmenorrhea, no sexual problems, no change in urinary frequency, no vaginal discharge, no unexplained vaginal bleeding and no lesion/sore.    Breasts: No masses.  No nipple discharge.  No redness    Objective   /78   Ht 1.499 m (4' 11\")   Wt 68.9 kg (152 lb)   LMP 04/01/2024   BMI 30.70 kg/m²        Assessment/Plan   1) menopause symptoms  Lifestyle changes including exercise and Seline diet reviewed  Does have a therapist and will see therapist to evaluate her medications as her symptoms are mostly mood irregularities  Not having night sweats due to her medication  Mild hot flashes throughout the day  Will follow-up in August with her annual.  Will also follow-up with her therapist      Thank you for your visit to our office today.     "

## 2024-06-12 ENCOUNTER — APPOINTMENT (OUTPATIENT)
Dept: BEHAVIORAL HEALTH | Facility: CLINIC | Age: 54
End: 2024-06-12
Payer: COMMERCIAL

## 2024-06-12 DIAGNOSIS — F41.9 ANXIETY: ICD-10-CM

## 2024-06-12 DIAGNOSIS — G47.00 INSOMNIA, UNSPECIFIED TYPE: ICD-10-CM

## 2024-06-12 DIAGNOSIS — F31.9 BIPOLAR AFFECTIVE DISORDER, REMISSION STATUS UNSPECIFIED (MULTI): ICD-10-CM

## 2024-06-12 DIAGNOSIS — F32.A DEPRESSION, UNSPECIFIED DEPRESSION TYPE: ICD-10-CM

## 2024-06-12 PROCEDURE — 1036F TOBACCO NON-USER: CPT | Performed by: CLINICAL NURSE SPECIALIST

## 2024-06-12 PROCEDURE — 99214 OFFICE O/P EST MOD 30 MIN: CPT | Performed by: CLINICAL NURSE SPECIALIST

## 2024-06-12 RX ORDER — LURASIDONE HYDROCHLORIDE 120 MG/1
120 TABLET, FILM COATED ORAL DAILY
Qty: 30 TABLET | Refills: 1 | Status: SHIPPED | OUTPATIENT
Start: 2024-06-12 | End: 2024-07-12

## 2024-06-12 NOTE — PROGRESS NOTES
Outpatient Psychiatry      Subjective   Yamile Cook, a 53 y.o. female presents for follow up for a virtual appointment for an established patient for a  medication management /outpatient psychiatry appointment       Diagnosis:         Anxiety (300.00) (F41.9)   · Depression unspecified depression type         Insomnia unspecified type    · Bipolar affective disorder remission status unspecified     Treatment Plan/Recommendations: can follow up for meds in 1 month ,can can continue self care and wellness efforts and maintain routine health screenings , can call  for treatment concerns   Follow up plan was discussed with patient     Review with patient: Treatment plan reviewed with the patient.  Medication risks/benefit reviewed with the patient    HPI:  reconciled and reviewed medication list in the [facility] emr   support provided for stressors   She says she was told she is in perimenopause by her gyn provider   She says she took 10 pills of trazodone in an attempt to end her life while she was drinking after she has an argument with her boyfriend , she says she threw them up pretty quickly and she woke up the next day , this was a few weeks ago , she told friends and did not tell medical , she explains her boyfriend has had health issues , and they have not spent as much time together, this has been stressful    She says she cries all the time   She is having some difficulties in daily functioning   She will have an upcoming therapy appointment with dr medrano , she saw him in early may   She says she has had racing thoughts and she has felt manic a few times , she explains she has felt out of control at these times , she says she feels like at those times it is hard to think things out rationally and then she reacts   Reviewed most recent therapy note in the [facility] emr , she is scheduled for this Friday for therapy   She is compliant with her medications   She has high anxiety with stressors   No plan for  self harm , has passive thoughts of self harm   No thoughts of self harm   No psychoses   Appetite is good , no eating disorder symptoms   She deals with back pain daily , and at times it can be worse , she has a standing desk at work also , this is treated per pcp , reviewed pcp note in the [facility] emr , most recent        Current Outpatient Medications:     biotin 2,500 mcg capsule, Take by mouth., Disp: , Rfl:     buPROPion XL (Wellbutrin XL) 150 mg 24 hr tablet, Take 1 tablet (150 mg) by mouth once daily in the morning. Do not crush, chew, or split., Disp: 90 tablet, Rfl: 1    cholecalciferol (Vitamin D-3) 125 MCG (5000 UT) capsule, Take by mouth., Disp: , Rfl:     cyanocobalamin (Vitamin B-12) 100 mcg tablet, Take by mouth., Disp: , Rfl:     lurasidone (Latuda) 120 mg tablet, Take 1 tablet (120 mg) by mouth once daily., Disp: 30 tablet, Rfl: 1    meloxicam (Mobic) 7.5 mg tablet, Take 1 tablet (7.5 mg) by mouth once daily as needed for moderate pain (4 - 6)., Disp: 90 tablet, Rfl: 0    multivitamin with minerals (Adults Multivitamin) tablet, Take by mouth., Disp: , Rfl:     omeprazole (PriLOSEC) 40 mg DR capsule, Take 1 capsule (40 mg) by mouth once daily., Disp: 90 capsule, Rfl: 3    rizatriptan (Maxalt) 10 mg tablet, Take by mouth., Disp: , Rfl:     tiZANidine (Zanaflex) 4 mg capsule, Take 1 capsule (4 mg) by mouth as needed at bedtime for muscle spasms., Disp: 90 capsule, Rfl: 0    traZODone (Desyrel) 100 mg tablet, Take 2 tablets (200 mg) by mouth once daily at bedtime., Disp: 180 tablet, Rfl: 1    valACYclovir (Valtrex) 1 gram tablet, Take 1 tablet (1,000 mg) by mouth 3 times a day., Disp: , Rfl:     Record Review: brief     Vitals:  There were no vitals filed for this visit.    Ceci Berry, APRN-CNS

## 2024-06-14 ENCOUNTER — APPOINTMENT (OUTPATIENT)
Dept: BEHAVIORAL HEALTH | Facility: CLINIC | Age: 54
End: 2024-06-14
Payer: COMMERCIAL

## 2024-06-14 DIAGNOSIS — F32.A ANXIETY AND DEPRESSION: ICD-10-CM

## 2024-06-14 DIAGNOSIS — F41.9 ANXIETY AND DEPRESSION: ICD-10-CM

## 2024-06-14 PROCEDURE — 90837 PSYTX W PT 60 MINUTES: CPT | Performed by: PSYCHOLOGIST
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
## 2024-06-21 ENCOUNTER — TELEMEDICINE (OUTPATIENT)
Dept: BEHAVIORAL HEALTH | Facility: CLINIC | Age: 54
End: 2024-06-21
Payer: COMMERCIAL

## 2024-06-21 DIAGNOSIS — F32.A ANXIETY AND DEPRESSION: ICD-10-CM

## 2024-06-21 DIAGNOSIS — F41.9 ANXIETY AND DEPRESSION: ICD-10-CM

## 2024-06-21 PROCEDURE — 90837 PSYTX W PT 60 MINUTES: CPT | Performed by: PSYCHOLOGIST
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
## 2024-07-05 ENCOUNTER — TELEMEDICINE (OUTPATIENT)
Dept: BEHAVIORAL HEALTH | Facility: CLINIC | Age: 54
End: 2024-07-05
Payer: COMMERCIAL

## 2024-07-05 ENCOUNTER — APPOINTMENT (OUTPATIENT)
Dept: PRIMARY CARE | Facility: CLINIC | Age: 54
End: 2024-07-05
Payer: COMMERCIAL

## 2024-07-05 DIAGNOSIS — F41.8 DEPRESSION WITH ANXIETY: ICD-10-CM

## 2024-07-05 PROCEDURE — 90837 PSYTX W PT 60 MINUTES: CPT | Performed by: PSYCHOLOGIST
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
## 2024-07-09 ENCOUNTER — APPOINTMENT (OUTPATIENT)
Dept: BEHAVIORAL HEALTH | Facility: CLINIC | Age: 54
End: 2024-07-09
Payer: COMMERCIAL

## 2024-07-09 DIAGNOSIS — G47.00 INSOMNIA, UNSPECIFIED TYPE: ICD-10-CM

## 2024-07-09 DIAGNOSIS — F32.A DEPRESSION, UNSPECIFIED DEPRESSION TYPE: ICD-10-CM

## 2024-07-09 DIAGNOSIS — F31.9 BIPOLAR AFFECTIVE DISORDER, REMISSION STATUS UNSPECIFIED (MULTI): ICD-10-CM

## 2024-07-09 DIAGNOSIS — G89.29 CHRONIC BACK PAIN, UNSPECIFIED BACK LOCATION, UNSPECIFIED BACK PAIN LATERALITY: ICD-10-CM

## 2024-07-09 DIAGNOSIS — M54.9 CHRONIC BACK PAIN, UNSPECIFIED BACK LOCATION, UNSPECIFIED BACK PAIN LATERALITY: ICD-10-CM

## 2024-07-09 PROCEDURE — 99213 OFFICE O/P EST LOW 20 MIN: CPT | Performed by: CLINICAL NURSE SPECIALIST

## 2024-07-09 RX ORDER — BUPROPION HYDROCHLORIDE 150 MG/1
150 TABLET ORAL EVERY MORNING
Qty: 90 TABLET | Refills: 0 | Status: SHIPPED | OUTPATIENT
Start: 2024-07-09 | End: 2024-10-07

## 2024-07-09 RX ORDER — TRAZODONE HYDROCHLORIDE 100 MG/1
200 TABLET ORAL NIGHTLY
Qty: 180 TABLET | Refills: 0 | Status: SHIPPED | OUTPATIENT
Start: 2024-07-09 | End: 2024-10-07

## 2024-07-09 RX ORDER — MELOXICAM 7.5 MG/1
7.5 TABLET ORAL DAILY
COMMUNITY

## 2024-07-09 RX ORDER — SEMAGLUTIDE 0.25 MG/.5ML
0.25 INJECTION, SOLUTION SUBCUTANEOUS
COMMUNITY

## 2024-07-09 RX ORDER — LURASIDONE HYDROCHLORIDE 120 MG/1
120 TABLET, FILM COATED ORAL DAILY
Qty: 90 TABLET | Refills: 0 | Status: SHIPPED | OUTPATIENT
Start: 2024-07-09 | End: 2024-10-07

## 2024-07-09 NOTE — PROGRESS NOTES
Outpatient Psychiatry      Subjective   Yamile Cook, a 53 y.o. female, for an audio visual [facility] my chart virtual appointment for follow up for an outpatient follow up appointment as an established patient   Patient is referred by Dione Chavarria MD .        Assessment/Plan can continue latuda 120 mg daily for mood stabilization, can continue bupropion xl 150 mg , daily each morning , can continue self care and wellness efforts and maintain routine health screenings , can call  for treatment and scheduling concerns. 4 months follow up per audio visual [facility] my chart virtual appointment      Diagnosis:   Patient Active Problem List   Diagnosis    Acute post-operative pain    Acute URI    Alteration in nutrition    Anxiety    Back pain    Bariatric surgery status    BPPV (benign paroxysmal positional vertigo)    Breast calcification, right    Candidiasis of vagina    Contusion of toe of right foot    Coronavirus infection    COVID-19    Bipolar affective disorder (Multi)    Depression    Dermatitis, eczematoid    Dysmetabolic syndrome    Esophageal reflux    Essential hypertension    Fall, accidental    Fever and chills    HSV-2 infection    Hyperlipidemia    Impaired fasting glucose    Insomnia    Intestinal malabsorption following gastrectomy (HHS-HCC)    Lung nodule, solitary    Mammogram abnormal    Menorrhagia with irregular cycle    Migraines    Morbid obesity (Multi)    Nocturnal hypoxemia    Breathing-related sleep disorder    FRANCE (obstructive sleep apnea)    Painful urination    Pleurisy    Psychological factors affecting morbid obesity (Multi)    Restless leg syndrome    SOB (shortness of breath)    Sore throat    Strain of lumbar region    Strep tonsillitis    Thoracic radiculitis    Thoracic sprain    Vitamin D deficiency    Abnormal Pap smear of cervix    History of manic depressive disorder     Treatment Plan/Recommendations:   Follow-up plan was discussed with patient. See above  assessment section of this note     Review with patient: Treatment plan reviewed with the patient.  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 and spending time with friends and enjoying this   Sleep is good   Mood is stable   No excessive anxiety   No side effects with psychotropic medications   She was dating someone for 2 years and he abruptly stopped communicating with her   She has daily back pain and she takes a muscle relaxer and meloxicam as needed when it is worse   No thoughts of self harm   No concerns with cognitive functioning     Negative ros psych   Medical ros see above in this note     Current Outpatient Medications:     biotin 2,500 mcg capsule, Take by mouth., Disp: , Rfl:     buPROPion XL (Wellbutrin XL) 150 mg 24 hr tablet, Take 1 tablet (150 mg) by mouth once daily in the morning. Do not crush, chew, or split., Disp: 90 tablet, Rfl: 0    cholecalciferol (Vitamin D-3) 125 MCG (5000 UT) capsule, Take by mouth., Disp: , Rfl:     cyanocobalamin (Vitamin B-12) 100 mcg tablet, Take by mouth., Disp: , Rfl:     lurasidone (Latuda) 120 mg tablet, Take 1 tablet (120 mg) by mouth once daily., Disp: 90 tablet, Rfl: 0    multivitamin with minerals (Adults Multivitamin) tablet, Take by mouth., Disp: , Rfl:     omeprazole (PriLOSEC) 40 mg DR capsule, Take 1 capsule (40 mg) by mouth once daily., Disp: 90 capsule, Rfl: 3    rizatriptan (Maxalt) 10 mg tablet, Take by mouth., Disp: , Rfl:     semaglutide, weight loss, (Wegovy) 0.25 mg/0.5 mL pen injector, Inject 0.25 mg under the skin every 7 days., Disp: , Rfl:     tiZANidine (Zanaflex) 4 mg capsule, Take 1 capsule (4 mg) by mouth as needed at  bedtime for muscle spasms., Disp: 90 capsule, Rfl: 0    traZODone (Desyrel) 100 mg tablet, Take 2 tablets (200 mg) by mouth once daily at bedtime., Disp: 180 tablet, Rfl: 0    valACYclovir (Valtrex) 1 gram tablet, Take 1 tablet (1,000 mg) by mouth 3 times a day., Disp: , Rfl:   Medical History:  No past medical history on file.  Surgical History:  Past Surgical History:   Procedure Laterality Date    GASTRIC BYPASS      MULTIPLE TOOTH EXTRACTIONS      single     Family History:  Family History   Problem Relation Name Age of Onset    Breast cancer Mother      Pancreatic cancer Mother      Depression Mother       Social History:  Social History     Socioeconomic History    Marital status: Single     Spouse name: Not on file    Number of children: Not on file    Years of education: Not on file    Highest education level: Not on file   Occupational History    Not on file   Tobacco Use    Smoking status: Former     Types: Cigarettes    Smokeless tobacco: Never   Substance and Sexual Activity    Alcohol use: Yes    Drug use: Never    Sexual activity: Not on file   Other Topics Concern    Not on file   Social History Narrative    Not on file     Social Determinants of Health     Financial Resource Strain: Not on file   Food Insecurity: Not on file   Transportation Needs: Not on file   Physical Activity: Not on file   Stress: Not on file   Social Connections: Not on file   Intimate Partner Violence: Not on file   Housing Stability: Not on file     Record Review: brief     Vitals:  There were no vitals filed for this visit.    Ceci Berry, SAMSON-CNS

## 2024-07-19 ENCOUNTER — APPOINTMENT (OUTPATIENT)
Dept: BEHAVIORAL HEALTH | Facility: CLINIC | Age: 54
End: 2024-07-19
Payer: COMMERCIAL

## 2024-08-02 ENCOUNTER — APPOINTMENT (OUTPATIENT)
Dept: BEHAVIORAL HEALTH | Facility: CLINIC | Age: 54
End: 2024-08-02
Payer: COMMERCIAL

## 2024-08-05 ENCOUNTER — TELEPHONE (OUTPATIENT)
Dept: OBSTETRICS AND GYNECOLOGY | Facility: CLINIC | Age: 54
End: 2024-08-05
Payer: COMMERCIAL

## 2024-08-05 NOTE — TELEPHONE ENCOUNTER
Yamile Cook called in stating that after 3 months of having no menstrual she started on 7/24/2024. It lasted her about 8 days which is not normal for her. Patient stated first 2 days were very heavy. Then went to moderate. States the menstrual ended and then she had a break for 3 days. This morning she started her menstrual again 8/5/24. Patient wants to know is this normal.    ADAL BAGLEY MA

## 2024-08-09 ENCOUNTER — TELEMEDICINE (OUTPATIENT)
Dept: BEHAVIORAL HEALTH | Facility: CLINIC | Age: 54
End: 2024-08-09
Payer: COMMERCIAL

## 2024-08-09 DIAGNOSIS — F41.8 DEPRESSION WITH ANXIETY: ICD-10-CM

## 2024-08-09 PROCEDURE — 90834 PSYTX W PT 45 MINUTES: CPT | Performed by: PSYCHOLOGIST
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
## 2024-08-27 ENCOUNTER — APPOINTMENT (OUTPATIENT)
Dept: OBSTETRICS AND GYNECOLOGY | Facility: CLINIC | Age: 54
End: 2024-08-27
Payer: COMMERCIAL

## 2024-08-27 VITALS
WEIGHT: 157 LBS | SYSTOLIC BLOOD PRESSURE: 138 MMHG | DIASTOLIC BLOOD PRESSURE: 74 MMHG | HEIGHT: 59 IN | BODY MASS INDEX: 31.65 KG/M2

## 2024-08-27 DIAGNOSIS — Z12.31 VISIT FOR SCREENING MAMMOGRAM: ICD-10-CM

## 2024-08-27 DIAGNOSIS — Z01.419 WELL WOMAN EXAM WITH ROUTINE GYNECOLOGICAL EXAM: Primary | ICD-10-CM

## 2024-08-27 PROCEDURE — 3008F BODY MASS INDEX DOCD: CPT | Performed by: OBSTETRICS & GYNECOLOGY

## 2024-08-27 PROCEDURE — 3075F SYST BP GE 130 - 139MM HG: CPT | Performed by: OBSTETRICS & GYNECOLOGY

## 2024-08-27 PROCEDURE — 1036F TOBACCO NON-USER: CPT | Performed by: OBSTETRICS & GYNECOLOGY

## 2024-08-27 PROCEDURE — 3078F DIAST BP <80 MM HG: CPT | Performed by: OBSTETRICS & GYNECOLOGY

## 2024-08-27 PROCEDURE — 87624 HPV HI-RISK TYP POOLED RSLT: CPT

## 2024-08-27 PROCEDURE — 88175 CYTOPATH C/V AUTO FLUID REDO: CPT

## 2024-08-27 PROCEDURE — 99396 PREV VISIT EST AGE 40-64: CPT | Performed by: OBSTETRICS & GYNECOLOGY

## 2024-08-27 NOTE — PROGRESS NOTES
" Yamile Cook is a 54 y.o. female who is here for a annual exam.     Periods are irregular, lasting 3-5  days.     Menopause symptoms: Denies    Sexually active: Perimenopausal  Active no concerns    Regular self breast exam: yes  no concerns on her exams    Menstrual History:  OB History          0    Para   0    Term   0       0    AB   0    Living   0         SAB   0    IAB   0    Ectopic   0    Multiple   0    Live Births   0                Patient's last menstrual period was 2024.         Review of Systems  All Normal Review of Systems  Constitutional: no fever, no chills, no recent weight gain, no recent weight loss and no fatigue.    Gastrointestinal: no abdominal pain, no constipation, no nausea, no diarrhea and no vomiting.    Genitourinary: no dysuria, no urinary incontinence, no vaginal dryness, no vaginal itching, no dyspareunia, no pelvic pain, no dysmenorrhea, no sexual problems, no change in urinary frequency, no vaginal discharge, no unexplained vaginal bleeding and no lesion/sore.    Breasts: No masses.  No nipple discharge.  No redness    Objective   /74   Ht 1.499 m (4' 11\")   Wt 71.2 kg (157 lb)   LMP 2024   BMI 31.71 kg/m²     OBGyn Exam   Physical Exam  Constitutional: Alert and in no acute distress. Well developed, well nourished.   Head and Face: Head and face: Normal.    Eyes: Normal external exam - nonicteric sclera, extraocular movements intact (EOMI) and no ptosis.   Ears, Nose, Mouth, and Throat: External inspection of ears and nose: Normal.    Neck: No neck asymmetry. Supple. Thyroid not enlarged and there were no palpable thyroid nodules.   Chest: Breasts: Normal appearance, no nipple discharge and no skin changes. Palpation of breasts and axillae: No palpable mass and no axillary lymphadenopathy.   Abdomen: Soft nontender; no abdominal mass palpated. No organomegaly. No hernias.     Genitourinary:   External genitalia: Normal. No inguinal " lymphadenopathy. Bartholin's Urethral and Skenes Glands: Normal. Urethra: Normal.    Bladder: Normal on palpation.   Vagina: Normal. No discharge  Cervix: Normal.    Uterus: Normal.    Right Adnexa/parametria: Normal.  Left Adnexa/parametria: Normal.    Inspection of Perianal Area: Normal.     Musculoskeletal: No joint swelling seen, normal movements of all extremities.   Skin: Normal skin color and pigmentation, normal skin turgor, and no rash.   Neurologic: Non-focal. Grossly intact.   Psychiatric: Alert and oriented x 3. Affect normal to patient baseline. Mood: Appropriate.      Assessment/Plan   1) Annual exam:  Pap with HPV testing completed  Mammogram order placed for next year    Thank you again for your visit to our office today  Please follow-up as needed or in 1 year with your annual exam

## 2024-08-28 ENCOUNTER — APPOINTMENT (OUTPATIENT)
Dept: PRIMARY CARE | Facility: CLINIC | Age: 54
End: 2024-08-28
Payer: COMMERCIAL

## 2024-09-10 LAB
CYTOLOGY CMNT CVX/VAG CYTO-IMP: NORMAL
HPV HR 12 DNA GENITAL QL NAA+PROBE: NEGATIVE
HPV HR GENOTYPES PNL CVX NAA+PROBE: NEGATIVE
HPV16 DNA SPEC QL NAA+PROBE: NEGATIVE
HPV18 DNA SPEC QL NAA+PROBE: NEGATIVE
LAB AP HPV GENOTYPE QUESTION: YES
LAB AP HPV HR: NORMAL
LABORATORY COMMENT REPORT: NORMAL
PATH REPORT.TOTAL CANCER: NORMAL

## 2024-09-20 ENCOUNTER — APPOINTMENT (OUTPATIENT)
Dept: BEHAVIORAL HEALTH | Facility: CLINIC | Age: 54
End: 2024-09-20
Payer: COMMERCIAL

## 2024-09-20 DIAGNOSIS — F32.A ANXIETY AND DEPRESSION: ICD-10-CM

## 2024-09-20 DIAGNOSIS — F41.9 ANXIETY AND DEPRESSION: ICD-10-CM

## 2024-09-20 PROCEDURE — 90837 PSYTX W PT 60 MINUTES: CPT | Performed by: PSYCHOLOGIST

## 2024-09-20 NOTE — PROGRESS NOTES
6PM 60476 Ind Psych for Depression and Anxiety (dx'ed  Bipolar in past) (45 MIN, 604-322). Virtual. Supportive Therapy.  Mood stable.  a lot of happened over the past few weeks.  Patient had initially reached out for this provider to complete [place] paperwork, having a hard time with things at work, more anxious and depressed.  She indicated that she was terminated today.  Was calling her boss's office and let go.  She had been expecting this for some time.  However, emotional blow was question by the fact that she is already started interviewing for other positions.  Is hopeful about a position that she is interviewed for a few times with [employer].  She indicated that when she began looking for jobs online she saw that her physician had it had been posted on Indeed which was a red flag that it was only a matter of time before she would be let go.  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  The position would be management of you as pay bills.  Also will continue to look for other employment.  She is she is going to try to get her unemployment website opened up to apply for unemployment should she need this.  Has been stressed financially.  She continues to do online dating.  Has been out with several men, most of them younger. plans on going out with someone that she met recently again this weekend.  Discussed another date that she had in which the person got angry at her because of what she said to him.  She reported that the relationship with mother continues to be a roller coaster.  Thinking back about her previous relationship with more and she believes she should have got out of the relationship brother that she did.  Discussed some frustration regarding friends dropping her at the last minute after they agreed to go to [place] [employer] to help celebrate her birthday.  Was able to be understanding to a  degree but disappointed.  Was able to reach out to other friends and still do something enjoyable on her birthday.  She has concerns about her finances and her healthcare coverage will end at the end of this month.  She will keep me posted when she wants to reinitiate treatment. Next: to call.

## 2024-10-02 ENCOUNTER — TELEPHONE (OUTPATIENT)
Dept: OBSTETRICS AND GYNECOLOGY | Facility: CLINIC | Age: 54
End: 2024-10-02
Payer: COMMERCIAL
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
## 2024-10-03 NOTE — TELEPHONE ENCOUNTER
Called patient.  Explained that Plan B can take up to 2 weeks to have a cycle.  Will follow accordingly  Is also perimenopausal  Skipped 3 months of cycles and has had 2 cycles recently.  Her last cycle was in September.  Patient reassured

## 2024-11-12 ENCOUNTER — APPOINTMENT (OUTPATIENT)
Dept: BEHAVIORAL HEALTH | Facility: CLINIC | Age: 54
End: 2024-11-12
Payer: COMMERCIAL

## 2024-11-12 DIAGNOSIS — F32.A DEPRESSION, UNSPECIFIED DEPRESSION TYPE: ICD-10-CM

## 2024-11-12 DIAGNOSIS — F31.9 BIPOLAR AFFECTIVE DISORDER, REMISSION STATUS UNSPECIFIED (MULTI): ICD-10-CM

## 2024-11-12 DIAGNOSIS — G47.00 INSOMNIA, UNSPECIFIED TYPE: ICD-10-CM

## 2024-11-12 PROCEDURE — 99212 OFFICE O/P EST SF 10 MIN: CPT | Performed by: CLINICAL NURSE SPECIALIST

## 2024-11-12 RX ORDER — BUPROPION HYDROCHLORIDE 150 MG/1
150 TABLET ORAL EVERY MORNING
Qty: 30 TABLET | Refills: 0 | Status: SHIPPED | OUTPATIENT
Start: 2024-11-12 | End: 2024-12-12

## 2024-11-12 RX ORDER — LURASIDONE HYDROCHLORIDE 120 MG/1
120 TABLET, FILM COATED ORAL DAILY
Qty: 30 TABLET | Refills: 0 | Status: SHIPPED | OUTPATIENT
Start: 2024-11-12 | End: 2024-12-12

## 2024-11-12 RX ORDER — TRAZODONE HYDROCHLORIDE 100 MG/1
200 TABLET ORAL NIGHTLY
Qty: 60 TABLET | Refills: 0 | Status: SHIPPED | OUTPATIENT
Start: 2024-11-12 | End: 2024-12-12

## 2024-11-12 NOTE — PROGRESS NOTES
Outpatient Psychiatry      Subjective   Yamile Cook, a 54 y.o. female, for an audio and video virtual [facility] my chart virtual appointment for follow up as an established patient   Patient is referred by Dione Chavarria MD .        Assessment/Plan:  Psychotropic medications :  can continue latuda 120 mg daily for mood stabilization,   can continue bupropion xl 150 mg , daily each morning , for depression   Continue trazodone 100 mg , 2 tablets at bedtime for sleep   can continue self care and wellness efforts and maintain routine health screenings , can call  for treatment and scheduling concerns. 4 months follow up per audio and video virtual [facility] my chart virtual appointment       Diagnosis:   Problem List       Patient Active Problem List   Diagnosis    Acute post-operative pain    Acute URI    Alteration in nutrition    Anxiety    Back pain    Bariatric surgery status    BPPV (benign paroxysmal positional vertigo)    Breast calcification, right    Candidiasis of vagina    Contusion of toe of right foot    Coronavirus infection    COVID-19    Bipolar affective disorder (Multi)    Depression    Dermatitis, eczematoid    Dysmetabolic syndrome    Esophageal reflux    Essential hypertension    Fall, accidental    Fever and chills    HSV-2 infection    Hyperlipidemia    Impaired fasting glucose    Insomnia    Intestinal malabsorption following gastrectomy (HHS-HCC)    Lung nodule, solitary    Mammogram abnormal    Menorrhagia with irregular cycle    Migraines    Morbid obesity (Multi)    Nocturnal hypoxemia    Breathing-related sleep disorder    FRANCE (obstructive sleep apnea)    Painful urination    Pleurisy    Psychological factors affecting morbid obesity (Multi)    Restless leg syndrome    SOB (shortness of breath)    Sore throat    Strain of lumbar region    Strep tonsillitis    Thoracic radiculitis    Thoracic sprain    Vitamin D deficiency    Abnormal Pap smear of cervix    History of manic  depressive disorder         Treatment Plan/Recommendations:   Follow-up plan was discussed with patient. See above assessment section of this note      Review with patient: Treatment plan reviewed with the patient.  Medication risks/benefit reviewed with the patient     HPI:  Patient is still doing therapy for support   She says her mood is improved with the increase in latuda at the previous appointment , no side effects with that   She started the wegovy shot for weight loss and she is tolerating that , she started this a week ago   She mentioned she is cross stitching and spending time with friends and enjoying this   Sleep is good   Mood is stable   No excessive anxiety   No side effects with psychotropic medications   She was dating someone for 2 years and he abruptly stopped communicating with her   She has daily back pain and she takes a muscle relaxer and meloxicam as needed when it is worse   No thoughts of self harm   No concerns with cognitive functioning      Negative ros psych   Medical ros see above in this note    Diagnosis:   Patient Active Problem List   Diagnosis    Acute post-operative pain    Acute URI    Alteration in nutrition    Anxiety    Back pain    Bariatric surgery status    BPPV (benign paroxysmal positional vertigo)    Breast calcification, right    Candidiasis of vagina    Contusion of toe of right foot    Coronavirus infection    COVID-19    Bipolar affective disorder (Multi)    Depression    Dermatitis, eczematoid    Dysmetabolic syndrome    Esophageal reflux    Essential hypertension    Fall, accidental    Fever and chills    HSV-2 infection    Hyperlipidemia    Impaired fasting glucose    Insomnia    Intestinal malabsorption following gastrectomy (HHS-HCC)    Lung nodule, solitary    Mammogram abnormal    Menorrhagia with irregular cycle    Migraines    Morbid obesity (Multi)    Nocturnal hypoxemia    Breathing-related sleep disorder    FRANCE (obstructive sleep apnea)    Painful  urination    Pleurisy    Psychological factors affecting morbid obesity (Multi)    Restless leg syndrome    SOB (shortness of breath)    Sore throat    Strain of lumbar region    Strep tonsillitis    Thoracic radiculitis    Thoracic sprain    Vitamin D deficiency    Abnormal Pap smear of cervix    History of manic depressive disorder     Current Outpatient Medications:     biotin 2,500 mcg capsule, Take by mouth., Disp: , Rfl:     buPROPion XL (Wellbutrin XL) 150 mg 24 hr tablet, Take 1 tablet (150 mg) by mouth once daily in the morning. Do not crush, chew, or split., Disp: 90 tablet, Rfl: 0    cholecalciferol (Vitamin D-3) 125 MCG (5000 UT) capsule, Take by mouth., Disp: , Rfl:     cyanocobalamin (Vitamin B-12) 100 mcg tablet, Take by mouth., Disp: , Rfl:     lurasidone (Latuda) 120 mg tablet, Take 1 tablet (120 mg) by mouth once daily., Disp: 90 tablet, Rfl: 0    meloxicam (Mobic) 7.5 mg tablet, Take 1 tablet (7.5 mg) by mouth once daily. As needed For back pain, Disp: , Rfl:     multivitamin with minerals (Adults Multivitamin) tablet, Take by mouth., Disp: , Rfl:     omeprazole (PriLOSEC) 40 mg DR capsule, Take 1 capsule (40 mg) by mouth once daily., Disp: 90 capsule, Rfl: 3    rizatriptan (Maxalt) 10 mg tablet, Take by mouth., Disp: , Rfl:     semaglutide, weight loss, (Wegovy) 0.25 mg/0.5 mL pen injector, Inject 0.25 mg under the skin every 7 days., Disp: , Rfl:     tiZANidine (Zanaflex) 4 mg capsule, Take 1 capsule (4 mg) by mouth as needed at bedtime for muscle spasms., Disp: 90 capsule, Rfl: 0    traZODone (Desyrel) 100 mg tablet, Take 2 tablets (200 mg) by mouth once daily at bedtime., Disp: 180 tablet, Rfl: 0    valACYclovir (Valtrex) 1 gram tablet, Take 1 tablet (1,000 mg) by mouth 3 times a day., Disp: , Rfl:   Medical History:  No past medical history on file.  Surgical History:  Past Surgical History:   Procedure Laterality Date    GASTRIC BYPASS      MULTIPLE TOOTH EXTRACTIONS      single     Family  History:  Family History   Problem Relation Name Age of Onset    Breast cancer Mother      Pancreatic cancer Mother      Depression Mother       Social History:  Social History     Socioeconomic History    Marital status: Single     Spouse name: Not on file    Number of children: Not on file    Years of education: Not on file    Highest education level: Not on file   Occupational History    Not on file   Tobacco Use    Smoking status: Former     Types: Cigarettes    Smokeless tobacco: Never   Substance and Sexual Activity    Alcohol use: Yes    Drug use: Never    Sexual activity: Not on file   Other Topics Concern    Not on file   Social History Narrative    Not on file     Social Drivers of Health     Financial Resource Strain: Not on file   Food Insecurity: Not on file   Transportation Needs: Not on file   Physical Activity: Not on file   Stress: Not on file   Social Connections: Not on file   Intimate Partner Violence: Not on file   Housing Stability: Not on file     Vitals:  There were no vitals filed for this visit.    Ceci Berry, APRN-CNS

## 2024-12-02 ENCOUNTER — OFFICE VISIT (OUTPATIENT)
Dept: OBSTETRICS AND GYNECOLOGY | Facility: CLINIC | Age: 54
End: 2024-12-02
Payer: COMMERCIAL

## 2024-12-02 VITALS — BODY MASS INDEX: 32.32 KG/M2 | WEIGHT: 160 LBS | DIASTOLIC BLOOD PRESSURE: 83 MMHG | SYSTOLIC BLOOD PRESSURE: 139 MMHG

## 2024-12-02 DIAGNOSIS — A60.04 HERPES SIMPLEX VIRUS (HSV) INFECTION OF VAGINA: ICD-10-CM

## 2024-12-02 DIAGNOSIS — N89.8 VAGINAL IRRITATION: Primary | ICD-10-CM

## 2024-12-02 DIAGNOSIS — R30.0 DYSURIA: ICD-10-CM

## 2024-12-02 DIAGNOSIS — B37.31 YEAST VAGINITIS: ICD-10-CM

## 2024-12-02 DIAGNOSIS — Z11.3 SCREENING EXAMINATION FOR STI: ICD-10-CM

## 2024-12-02 PROCEDURE — 87205 SMEAR GRAM STAIN: CPT

## 2024-12-02 PROCEDURE — 87591 N.GONORRHOEAE DNA AMP PROB: CPT

## 2024-12-02 PROCEDURE — 87491 CHLMYD TRACH DNA AMP PROBE: CPT

## 2024-12-02 PROCEDURE — 87086 URINE CULTURE/COLONY COUNT: CPT

## 2024-12-02 PROCEDURE — 99214 OFFICE O/P EST MOD 30 MIN: CPT | Performed by: ADVANCED PRACTICE MIDWIFE

## 2024-12-02 PROCEDURE — 3079F DIAST BP 80-89 MM HG: CPT | Performed by: ADVANCED PRACTICE MIDWIFE

## 2024-12-02 PROCEDURE — 3075F SYST BP GE 130 - 139MM HG: CPT | Performed by: ADVANCED PRACTICE MIDWIFE

## 2024-12-02 PROCEDURE — 87661 TRICHOMONAS VAGINALIS AMPLIF: CPT

## 2024-12-02 RX ORDER — FLUCONAZOLE 150 MG/1
150 TABLET ORAL ONCE
Qty: 1 TABLET | Refills: 0 | Status: SHIPPED | OUTPATIENT
Start: 2024-12-02 | End: 2024-12-02

## 2024-12-02 RX ORDER — VALACYCLOVIR HYDROCHLORIDE 1 G/1
1000 TABLET, FILM COATED ORAL DAILY
Qty: 5 TABLET | Refills: 3 | Status: SHIPPED | OUTPATIENT
Start: 2024-12-02 | End: 2024-12-07

## 2024-12-02 ASSESSMENT — ENCOUNTER SYMPTOMS
ENDOCRINE NEGATIVE: 0
GASTROINTESTINAL NEGATIVE: 0
EYES NEGATIVE: 0
ALLERGIC/IMMUNOLOGIC NEGATIVE: 0
HEMATOLOGIC/LYMPHATIC NEGATIVE: 0
CONSTITUTIONAL NEGATIVE: 0
CARDIOVASCULAR NEGATIVE: 0
RESPIRATORY NEGATIVE: 0
NEUROLOGICAL NEGATIVE: 0
MUSCULOSKELETAL NEGATIVE: 0
PSYCHIATRIC NEGATIVE: 0

## 2024-12-02 NOTE — PROGRESS NOTES
Subjective   Patient ID: Yamile Cook is a 54 y.o. female who presents for Vaginal Discharge.  Yamile presents today with concern for vaginal irritation for the past 2 days. Burning/irritation started the day after having unprotected intercourse. Has also noticed pain with urination that started at the same time.   She started augmentin for a dental abscess 5 days ago and thinks she has developed a yeast infection. Noticed an increase in vaginal discharge.   3 weeks ago had unprotected intercourse and took Plan B immediately after and is requesting STI screening as well as a pregnancy test.   Reports irregular periods, sometimes as infrequently as every 3 months.     Vaginal Discharge  The patient's primary symptoms include vaginal discharge.       Review of Systems   Genitourinary:  Positive for vaginal discharge.       Objective   Physical Exam  Vitals reviewed.   Constitutional:       Appearance: Normal appearance.   Pulmonary:      Effort: Pulmonary effort is normal.   Abdominal:      General: Abdomen is flat.      Palpations: Abdomen is soft.   Genitourinary:     Comments: Bilateral vesicles noted on labia minora and at introitus, moderate amount thick, white discharge noted, +mild erythema of mucosa   Neurological:      Mental Status: She is alert.         Assessment/Plan   Diagnoses and all orders for this visit:  Vaginal irritation  -     Vaginitis Gram Stain For Bacterial Vaginosis + Yeast  -     C. trachomatis / N. gonorrhoeae, Amplified  -     Trichomonas vaginalis, Amplified  -     fluconazole (Diflucan) 150 mg tablet; Take 1 tablet (150 mg) by mouth 1 time for 1 dose.  Yeast vaginitis  -     fluconazole (Diflucan) 150 mg tablet; Take 1 tablet (150 mg) by mouth 1 time for 1 dose.  Herpes simplex virus (HSV) infection of vagina  -     valACYclovir (Valtrex) 1 gram tablet; Take 1 tablet (1,000 mg) by mouth once daily for 5 days.  Screening examination for STI  -     HIV 1/2 Antigen/Antibody Screen with  Reflex to Confirmation; Future  Dysuria  -     Urine Culture           SAMSON Diza-LUCAS 12/02/24 1:34 PM

## 2024-12-03 ENCOUNTER — TELEPHONE (OUTPATIENT)
Dept: OBSTETRICS AND GYNECOLOGY | Facility: CLINIC | Age: 54
End: 2024-12-03
Payer: COMMERCIAL

## 2024-12-03 LAB
BACTERIA UR CULT: NORMAL
BACTERIAL VAGINOSIS VAG-IMP: NORMAL
C TRACH RRNA SPEC QL NAA+PROBE: NEGATIVE
CLUE CELLS VAG LPF-#/AREA: NORMAL /[LPF]
N GONORRHOEA DNA SPEC QL PROBE+SIG AMP: NEGATIVE
NUGENT SCORE: 4
T VAGINALIS RRNA SPEC QL NAA+PROBE: NEGATIVE
YEAST VAG WET PREP-#/AREA: NORMAL

## 2024-12-03 NOTE — TELEPHONE ENCOUNTER
Patient advised that yeast is negative, and clue cells negative,  in determinant results of intermediate on vaginitis screen may only need treatment based on symptoms and provider's assessment.  Urine culture is pending.  Patient has some dysuria, herpes outbreak, is on medication for that.  Is on Augmentin for dental issues.  Took one diflucan so far.    Will await urine culture results.

## 2024-12-03 NOTE — TELEPHONE ENCOUNTER
[facility] Message sent to [facility] [address].    Kathy Lam MA

## 2024-12-03 NOTE — TELEPHONE ENCOUNTER
Patient called in requesting results from visit 12/2/24.Patient did state that she saw Renetta DAS but is a patient of .      Li Christian CNA

## 2024-12-04 ENCOUNTER — LAB (OUTPATIENT)
Dept: LAB | Facility: LAB | Age: 54
End: 2024-12-04
Payer: COMMERCIAL

## 2024-12-04 DIAGNOSIS — Z11.3 SCREENING EXAMINATION FOR STI: ICD-10-CM

## 2024-12-04 LAB — HIV 1+2 AB+HIV1 P24 AG SERPL QL IA: NONREACTIVE

## 2024-12-04 PROCEDURE — 87389 HIV-1 AG W/HIV-1&-2 AB AG IA: CPT

## 2024-12-04 PROCEDURE — 36415 COLL VENOUS BLD VENIPUNCTURE: CPT

## 2024-12-20 ENCOUNTER — PHARMACY VISIT (OUTPATIENT)
Dept: PHARMACY | Facility: CLINIC | Age: 54
End: 2024-12-20
Payer: COMMERCIAL

## 2024-12-20 ENCOUNTER — OFFICE VISIT (OUTPATIENT)
Dept: URGENT CARE | Age: 54
End: 2024-12-20
Payer: COMMERCIAL

## 2024-12-20 ENCOUNTER — LAB (OUTPATIENT)
Dept: LAB | Facility: LAB | Age: 54
End: 2024-12-20
Payer: COMMERCIAL

## 2024-12-20 VITALS
TEMPERATURE: 99.2 F | DIASTOLIC BLOOD PRESSURE: 86 MMHG | WEIGHT: 150 LBS | SYSTOLIC BLOOD PRESSURE: 145 MMHG | OXYGEN SATURATION: 96 % | RESPIRATION RATE: 18 BRPM | HEART RATE: 82 BPM | BODY MASS INDEX: 30.3 KG/M2

## 2024-12-20 DIAGNOSIS — U07.1 COVID: ICD-10-CM

## 2024-12-20 DIAGNOSIS — J01.00 ACUTE MAXILLARY SINUSITIS, RECURRENCE NOT SPECIFIED: Primary | ICD-10-CM

## 2024-12-20 LAB
ANION GAP SERPL CALC-SCNC: 12 MMOL/L (ref 10–20)
BUN SERPL-MCNC: 14 MG/DL (ref 6–23)
CALCIUM SERPL-MCNC: 9.5 MG/DL (ref 8.6–10.6)
CHLORIDE SERPL-SCNC: 102 MMOL/L (ref 98–107)
CO2 SERPL-SCNC: 27 MMOL/L (ref 21–32)
CREAT SERPL-MCNC: 0.52 MG/DL (ref 0.5–1.05)
EGFRCR SERPLBLD CKD-EPI 2021: >90 ML/MIN/1.73M*2
GLUCOSE SERPL-MCNC: 97 MG/DL (ref 74–99)
POC SARS-COV-2 AG BINAX: ABNORMAL
POTASSIUM SERPL-SCNC: 4.1 MMOL/L (ref 3.5–5.3)
SODIUM SERPL-SCNC: 137 MMOL/L (ref 136–145)

## 2024-12-20 PROCEDURE — 36415 COLL VENOUS BLD VENIPUNCTURE: CPT

## 2024-12-20 PROCEDURE — 80048 BASIC METABOLIC PNL TOTAL CA: CPT

## 2024-12-20 PROCEDURE — RXMED WILLOW AMBULATORY MEDICATION CHARGE

## 2024-12-20 RX ORDER — NIRMATRELVIR AND RITONAVIR 300-100 MG
3 KIT ORAL 2 TIMES DAILY
Qty: 30 TABLET | Refills: 0 | Status: SHIPPED | OUTPATIENT
Start: 2024-12-20 | End: 2024-12-20 | Stop reason: ALTCHOICE

## 2024-12-20 RX ORDER — AZELASTINE 1 MG/ML
1 SPRAY, METERED NASAL 2 TIMES DAILY
Qty: 30 ML | Refills: 0 | Status: SHIPPED | OUTPATIENT
Start: 2024-12-20 | End: 2025-01-19

## 2024-12-20 ASSESSMENT — ENCOUNTER SYMPTOMS
MYALGIAS: 0
WHEEZING: 0
CHILLS: 1
DIZZINESS: 0
ARTHRALGIAS: 0
SINUS PRESSURE: 0
VOMITING: 0
RHINORRHEA: 0
SINUS PAIN: 1
SHORTNESS OF BREATH: 0
CHOKING: 0
ADENOPATHY: 0
SINUS COMPLAINT: 1
CHEST TIGHTNESS: 0
APNEA: 0
PALPITATIONS: 0
CONFUSION: 0
NAUSEA: 0
FACIAL SWELLING: 0
HEADACHES: 0
COUGH: 0
FEVER: 0
SORE THROAT: 0

## 2024-12-20 ASSESSMENT — PATIENT HEALTH QUESTIONNAIRE - PHQ9
2. FEELING DOWN, DEPRESSED OR HOPELESS: NOT AT ALL
1. LITTLE INTEREST OR PLEASURE IN DOING THINGS: NOT AT ALL
SUM OF ALL RESPONSES TO PHQ9 QUESTIONS 1 AND 2: 0

## 2024-12-20 NOTE — PROGRESS NOTES
Subjective   Patient ID: Yamile Cook is a 54 y.o. female. They present today with a chief complaint of Sinus Problem (Think she has a sinuses infection, OTC, sinus dried out. X2days. CGMA).    History of Present Illness  55 yo non-smoker ,sinus pressure, taking pseudoephedrine causing significant dry sinus, postnasal drip, subjective chills no fever no chest pain or shortness of breath.  Remote history of COVID 1.5 years ago. She works from home.       History provided by:  Patient   used: No    Sinus Problem  Duration:  2 days  Timing:  Constant  Progression:  Unchanged  Chronicity:  New  Associated symptoms: no chest pain, no congestion, no cough, no ear pain, no fever, no headaches, no myalgias, no nausea, no rash, no rhinorrhea, no shortness of breath, no sore throat, no vomiting and no wheezing        Past Medical History  Allergies as of 12/20/2024    (No Known Allergies)       (Not in a hospital admission)       History reviewed. No pertinent past medical history.    Past Surgical History:   Procedure Laterality Date    GASTRIC BYPASS      MULTIPLE TOOTH EXTRACTIONS      single        reports that she has quit smoking. Her smoking use included cigarettes. She has never used smokeless tobacco. She reports current alcohol use. She reports that she does not use drugs.    Review of Systems  Review of Systems   Constitutional:  Positive for chills. Negative for fever.   HENT:  Positive for postnasal drip and sinus pain. Negative for congestion, ear pain, facial swelling, rhinorrhea, sinus pressure and sore throat.    Respiratory:  Negative for apnea, cough, choking, chest tightness, shortness of breath and wheezing.    Cardiovascular:  Negative for chest pain and palpitations.   Gastrointestinal:  Negative for nausea and vomiting.   Musculoskeletal:  Negative for arthralgias and myalgias.   Skin:  Negative for rash.   Neurological:  Negative for dizziness and headaches.   Hematological:   Negative for adenopathy.   Psychiatric/Behavioral:  Negative for confusion.                                   Objective    Vitals:    12/20/24 1510   BP: 145/86   BP Location: Left arm   Patient Position: Sitting   Pulse: 82   Resp: 18   Temp: 37.3 °C (99.2 °F)   TempSrc: Oral   SpO2: 96%   Weight: 68 kg (150 lb)     No LMP recorded. Patient is perimenopausal.    Physical Exam  Vitals and nursing note reviewed.   Constitutional:       General: She is not in acute distress.     Appearance: Normal appearance. She is not ill-appearing, toxic-appearing or diaphoretic.   HENT:      Right Ear: Tympanic membrane normal.      Left Ear: Tympanic membrane normal.      Nose: Congestion present. No rhinorrhea.      Mouth/Throat:      Pharynx: No oropharyngeal exudate or posterior oropharyngeal erythema.   Cardiovascular:      Rate and Rhythm: Normal rate and regular rhythm.      Pulses: Normal pulses.      Heart sounds: Normal heart sounds.   Pulmonary:      Effort: Pulmonary effort is normal.      Breath sounds: Normal breath sounds.   Neurological:      General: No focal deficit present.      Mental Status: She is alert and oriented to person, place, and time.   Psychiatric:         Mood and Affect: Mood normal.         Behavior: Behavior normal.         Procedures    Point of Care Test & Imaging Results from this visit  Results for orders placed or performed in visit on 12/20/24   POCT Covid-19 Rapid Antigen   Result Value Ref Range    POC RENO-COV-2 AG Positive test for SARS-CoV-2 (antigen detected) (A) Presumptive negative test for SARS-CoV-2 (no antigen detected)      No results found.    Diagnostic study results (if any) were reviewed by Arianne Hirsch.    Assessment/Plan   Allergies, medications, history, and pertinent labs/EKGs/Imaging reviewed by Arianne Hirsch.     Medical Decision Making  54-year-old with acute COVID, 2 days symptoms, causing sinus pressure advised to discontinue pseudoephedrine treatment with  azelastine, Paxlovid.  She will  the prescription to wait for test results , start based on instructions from me.  Will continue to take Tylenol or ibuprofen as needed for symptom control.  I have advised for hydration with fluids.  If she is experiencing new or worsening symptoms she must return for evaluation continue to use a mask.    Addendum: Due to drug interaction with Latuda, patient was prescribed Molnupiravir. Advised to take 4 tabs twice a day for the next 5 days.    Orders and Diagnoses  Diagnoses and all orders for this visit:  Acute maxillary sinusitis, recurrence not specified  -     POCT Covid-19 Rapid Antigen  -     azelastine (Astelin) 137 mcg (0.1 %) nasal spray; Administer 1 spray into each nostril 2 times a day for 10 days. Use in each nostril as directed  COVID  -     Basic Metabolic Panel; Future  -     molnupiravir (Lagevrio) capsule capsule; Take 4 capsules (800 mg) by mouth every 12 hours.      Medical Admin Record      Patient disposition: Home    Electronically signed by Arianne Hirsch  12:06 AM

## 2024-12-20 NOTE — PATIENT INSTRUCTIONS
54-year-old with acute COVID, 2 days symptoms, causing sinus pressure advised to discontinue pseudoephedrine treatment with azelastine, Paxlovid.  She will  the prescription to wait for test results , start based on instructions from me.  Will continue to take Tylenol or ibuprofen as needed for symptom control.  I have advised for hydration with fluids.  If she is experiencing new or worsening symptoms she must return for evaluation continue to use a mask.    Addendum: Due to drug interaction with Latuda, patient was prescribed Molnupiravir. Advised to take 4 tabs twice a day for the next 5 days.

## 2025-01-21 ENCOUNTER — DOCUMENTATION (OUTPATIENT)
Dept: BEHAVIORAL HEALTH | Facility: CLINIC | Age: 55
End: 2025-01-21
Payer: COMMERCIAL

## 2025-01-21 DIAGNOSIS — G47.00 INSOMNIA, UNSPECIFIED TYPE: ICD-10-CM

## 2025-01-21 DIAGNOSIS — F31.9 BIPOLAR AFFECTIVE DISORDER, REMISSION STATUS UNSPECIFIED (MULTI): ICD-10-CM

## 2025-01-21 DIAGNOSIS — F32.A DEPRESSION, UNSPECIFIED DEPRESSION TYPE: ICD-10-CM

## 2025-01-21 RX ORDER — BUPROPION HYDROCHLORIDE 150 MG/1
150 TABLET ORAL EVERY MORNING
Qty: 90 TABLET | Refills: 0 | Status: SHIPPED | OUTPATIENT
Start: 2025-01-21 | End: 2025-01-25 | Stop reason: SDUPTHER

## 2025-01-21 RX ORDER — TRAZODONE HYDROCHLORIDE 100 MG/1
200 TABLET ORAL NIGHTLY
Qty: 180 TABLET | Refills: 0 | Status: SHIPPED | OUTPATIENT
Start: 2025-01-21 | End: 2025-04-21

## 2025-01-21 RX ORDER — LURASIDONE HYDROCHLORIDE 120 MG/1
120 TABLET, FILM COATED ORAL DAILY
Qty: 90 TABLET | Refills: 0 | Status: SHIPPED | OUTPATIENT
Start: 2025-01-21 | End: 2025-01-25 | Stop reason: SDUPTHER

## 2025-01-21 NOTE — PROGRESS NOTES
Nonbillable note : sent script to pharmacy for medications after patient indicated script needs , in the [facility] emr , and reviewed med order in the [facility] emr [facility]

## 2025-01-25 ENCOUNTER — DOCUMENTATION (OUTPATIENT)
Dept: BEHAVIORAL HEALTH | Facility: CLINIC | Age: 55
End: 2025-01-25
Payer: COMMERCIAL

## 2025-01-25 DIAGNOSIS — F31.9 BIPOLAR AFFECTIVE DISORDER, REMISSION STATUS UNSPECIFIED (MULTI): ICD-10-CM

## 2025-01-25 DIAGNOSIS — F32.A DEPRESSION, UNSPECIFIED DEPRESSION TYPE: ICD-10-CM

## 2025-01-25 RX ORDER — LURASIDONE HYDROCHLORIDE 120 MG/1
120 TABLET, FILM COATED ORAL DAILY
Qty: 90 TABLET | Refills: 0 | Status: SHIPPED | OUTPATIENT
Start: 2025-01-25 | End: 2025-04-25

## 2025-01-25 RX ORDER — BUPROPION HYDROCHLORIDE 150 MG/1
150 TABLET ORAL EVERY MORNING
Qty: 90 TABLET | Refills: 0 | Status: SHIPPED | OUTPATIENT
Start: 2025-01-25 | End: 2025-04-25

## 2025-01-25 NOTE — PROGRESS NOTES
Nonbillable note : sent refills to [facility] for latuda and bupropion per patient request [facility]

## 2025-02-06 DIAGNOSIS — K21.9 GASTROESOPHAGEAL REFLUX DISEASE, UNSPECIFIED WHETHER ESOPHAGITIS PRESENT: ICD-10-CM

## 2025-02-07 RX ORDER — OMEPRAZOLE 40 MG/1
40 CAPSULE, DELAYED RELEASE ORAL DAILY
Qty: 90 CAPSULE | Refills: 0 | OUTPATIENT
Start: 2025-02-07

## 2025-02-14 ENCOUNTER — TELEPHONE (OUTPATIENT)
Dept: PRIMARY CARE | Facility: CLINIC | Age: 55
End: 2025-02-14
Payer: COMMERCIAL

## 2025-02-14 DIAGNOSIS — K21.9 GASTROESOPHAGEAL REFLUX DISEASE, UNSPECIFIED WHETHER ESOPHAGITIS PRESENT: ICD-10-CM

## 2025-02-14 RX ORDER — OMEPRAZOLE 40 MG/1
40 CAPSULE, DELAYED RELEASE ORAL DAILY
Qty: 30 CAPSULE | Refills: 0 | Status: SHIPPED | OUTPATIENT
Start: 2025-02-14

## 2025-02-14 NOTE — TELEPHONE ENCOUNTER
Patient called and requested a refill on omeprazole DR capsules she stated it was denied  She made an appointment for the 28 th and wanted to know if she can get a refill on  The medication to cover her until she is seen?    [facility] [phone]    Patient stated she left a message on the voice mail and wanted to know if it was received?  [phone]

## 2025-02-28 ENCOUNTER — APPOINTMENT (OUTPATIENT)
Dept: PRIMARY CARE | Facility: CLINIC | Age: 55
End: 2025-02-28
Payer: COMMERCIAL

## 2025-02-28 VITALS
HEIGHT: 59 IN | WEIGHT: 157 LBS | BODY MASS INDEX: 31.65 KG/M2 | SYSTOLIC BLOOD PRESSURE: 112 MMHG | DIASTOLIC BLOOD PRESSURE: 66 MMHG

## 2025-02-28 DIAGNOSIS — Z00.00 ROUTINE GENERAL MEDICAL EXAMINATION AT A HEALTH CARE FACILITY: Primary | ICD-10-CM

## 2025-02-28 DIAGNOSIS — Z86.0100 HISTORY OF COLON POLYPS: ICD-10-CM

## 2025-02-28 DIAGNOSIS — Z11.59 NEED FOR HEPATITIS C SCREENING TEST: ICD-10-CM

## 2025-02-28 DIAGNOSIS — Z86.19 HISTORY OF HERPES GENITALIS: ICD-10-CM

## 2025-02-28 DIAGNOSIS — Z12.11 ENCOUNTER FOR SCREENING FOR MALIGNANT NEOPLASM OF COLON: ICD-10-CM

## 2025-02-28 DIAGNOSIS — K21.9 GASTROESOPHAGEAL REFLUX DISEASE, UNSPECIFIED WHETHER ESOPHAGITIS PRESENT: ICD-10-CM

## 2025-02-28 DIAGNOSIS — E66.9 OBESITY (BMI 30-39.9): ICD-10-CM

## 2025-02-28 DIAGNOSIS — E88.810 DYSMETABOLIC SYNDROME: ICD-10-CM

## 2025-02-28 DIAGNOSIS — F31.31 BIPOLAR AFFECTIVE DISORDER, CURRENTLY DEPRESSED, MILD (MULTI): ICD-10-CM

## 2025-02-28 DIAGNOSIS — G43.809 OTHER MIGRAINE WITHOUT STATUS MIGRAINOSUS, NOT INTRACTABLE: ICD-10-CM

## 2025-02-28 DIAGNOSIS — Z86.16 HISTORY OF COVID-19: ICD-10-CM

## 2025-02-28 DIAGNOSIS — Z98.84 HISTORY OF GASTRIC BYPASS: ICD-10-CM

## 2025-02-28 PROCEDURE — 99396 PREV VISIT EST AGE 40-64: CPT | Performed by: INTERNAL MEDICINE

## 2025-02-28 PROCEDURE — 3008F BODY MASS INDEX DOCD: CPT | Performed by: INTERNAL MEDICINE

## 2025-02-28 PROCEDURE — 3074F SYST BP LT 130 MM HG: CPT | Performed by: INTERNAL MEDICINE

## 2025-02-28 PROCEDURE — 1036F TOBACCO NON-USER: CPT | Performed by: INTERNAL MEDICINE

## 2025-02-28 PROCEDURE — 3078F DIAST BP <80 MM HG: CPT | Performed by: INTERNAL MEDICINE

## 2025-02-28 RX ORDER — OMEPRAZOLE 40 MG/1
40 CAPSULE, DELAYED RELEASE ORAL DAILY
Qty: 90 CAPSULE | Refills: 1 | Status: SHIPPED | OUTPATIENT
Start: 2025-02-28

## 2025-02-28 RX ORDER — VALACYCLOVIR HYDROCHLORIDE 1 G/1
1 TABLET, FILM COATED ORAL
COMMUNITY
Start: 2025-02-26 | End: 2025-02-28 | Stop reason: ALTCHOICE

## 2025-02-28 RX ORDER — VALACYCLOVIR HYDROCHLORIDE 500 MG/1
500 TABLET, FILM COATED ORAL DAILY
Qty: 90 TABLET | Refills: 3 | Status: SHIPPED | OUTPATIENT
Start: 2025-02-28 | End: 2026-02-28

## 2025-02-28 RX ORDER — TIRZEPATIDE 5 MG/.5ML
INJECTION, SOLUTION SUBCUTANEOUS
COMMUNITY
Start: 2025-02-12

## 2025-02-28 RX ORDER — RIZATRIPTAN BENZOATE 10 MG/1
10 TABLET ORAL DAILY PRN
Qty: 9 TABLET | Refills: 3 | Status: SHIPPED | OUTPATIENT
Start: 2025-02-28

## 2025-02-28 ASSESSMENT — PATIENT HEALTH QUESTIONNAIRE - PHQ9
1. LITTLE INTEREST OR PLEASURE IN DOING THINGS: NOT AT ALL
SUM OF ALL RESPONSES TO PHQ9 QUESTIONS 1 AND 2: 0
2. FEELING DOWN, DEPRESSED OR HOPELESS: NOT AT ALL

## 2025-02-28 NOTE — PROGRESS NOTES
"Subjective   Patient ID: Yamile Cook is a 54 y.o. female who presents for Annual Exam.    HPI   Yamile is a 54-year-old  female who comes today for a follow-up visit and she is way past due for an annual wellness exam and lab work.  Patient sees her gynecologist regarding Pap/pelvic as well as mammogram-last mammogram was done in January 2024 and an order is already in place for screening mammogram by her gynecologist.  Colonoscopy was done in March 2018 at which time polyps were removed-pathology revealed tubular adenoma, 5-year follow-up has been recommended and a colonoscopy will be ordered now.  Medications were reviewed and updated in the medical record and patient is compliant with them and reports no significant side effects.  She continues to see behavioral health provider for history of bipolar disorder.  Patient has been experiencing more flareups of genital herpes over the past several months.  She is agreeable to starting suppressive therapy with valacyclovir.  Migraines have been stable but she does need a refill on rizatriptan.  Heartburn is also stable for the most part and patient request refills on omeprazole which she takes daily.  No history of fever, chills, chest pain, shortness of breath, cough, dizziness, palpitations, syncope, abdominal pain, nausea, vomiting, diarrhea, melena, rectal bleeding, dysuria, hematuria, headaches, weakness, numbness, mood or sleep issues.  Review of Systems  As per HPI.  Objective   /66 (BP Location: Right arm, Patient Position: Sitting, BP Cuff Size: Large adult)   Ht 1.499 m (4' 11\")   Wt 71.2 kg (157 lb)   BMI 31.71 kg/m²     Physical Exam  General - Well developed, well appearing, obese middle-aged  female in no acute respiratory distress  Eyes - normal conjunctiva with no pallor or icterus, normal extraocular movements  Neck - No JVD, thyromegaly or lymphadenopathy  Lungs - no respiratory distress and lungs clear to auscultation " bilaterally with no rales or wheezes  Heart - normal S1, S2 with normal heart rate, rhythm and no murmurs   Breasts, Pap, pelvic-per gynecology  Abdomen - obese, soft, nontender with no masses or organomegaly,  Extremities - no cyanosis or pedal edema  Neuro - grossly normal neuro exam with no focal neuro deficits  Psych - normal mental status, mood and affect   Skin - no rashes or ulcers  MSK - normal gait with grossly normal ROM of major joints     Assessment/Plan        1.  Annual wellness exam-routine labs and colonoscopy order placed, patient will get her mammogram done in the near future and follow-up with gynecology regarding Pap/pelvic as recommended, patient advised to get the Shingrix vaccine at her local pharmacy in the near future  2.  History of colon polyps-colonoscopy order placed  3.  Bipolar disorder-stable and patient will follow-up with behavioral health provider  4.  Dysmetabolic syndrome-Labs including hemoglobin A1c and lipids will be checked, patient is getting Zepbound from a different provider and she is on 2.5 mg weekly and has lost 9 pounds since she started it a month ago  5.  GERD-stable and patient provided with omeprazole refill  6.  Need for hepatitis C screening-hepatitis C antibody will be ordered  7.  History of COVID-19 viral infection in December-patient has recovered well from the infection, she was treated with Lagevrio  8.  Status post gastric bypass-patient apparently lost about 100 pounds after the procedure  9.  History of genital herpes-valacyclovir 500 mg daily for suppressive therapy will be started  10.  Obesity with a BMI of over 30-patient has started Zepbound and has lost 9 pounds in the last month  11.  Migraines-stable, refill provided for triptan as needed  Follow-up in 6 to 12 months.  This note was partially generated using the Dragon voice recognition system. There may be some incorrect words, spelling and punctuation errors that were not corrected prior to  committing the note to the patient's medical record.

## 2025-03-18 ENCOUNTER — HOSPITAL ENCOUNTER (OUTPATIENT)
Dept: RADIOLOGY | Facility: CLINIC | Age: 55
Discharge: HOME | End: 2025-03-18
Payer: COMMERCIAL

## 2025-03-18 VITALS — WEIGHT: 156.97 LBS | HEIGHT: 59 IN | BODY MASS INDEX: 31.64 KG/M2

## 2025-03-18 DIAGNOSIS — Z12.31 VISIT FOR SCREENING MAMMOGRAM: ICD-10-CM

## 2025-03-18 PROCEDURE — 77063 BREAST TOMOSYNTHESIS BI: CPT | Performed by: RADIOLOGY

## 2025-03-18 PROCEDURE — 77067 SCR MAMMO BI INCL CAD: CPT

## 2025-03-18 PROCEDURE — 77067 SCR MAMMO BI INCL CAD: CPT | Performed by: RADIOLOGY

## 2025-03-20 ENCOUNTER — APPOINTMENT (OUTPATIENT)
Dept: BEHAVIORAL HEALTH | Facility: CLINIC | Age: 55
End: 2025-03-20
Payer: COMMERCIAL

## 2025-03-20 DIAGNOSIS — F33.40 RECURRENT MAJOR DEPRESSION IN REMISSION (CMS-HCC): ICD-10-CM

## 2025-03-20 DIAGNOSIS — F31.9 BIPOLAR AFFECTIVE DISORDER, REMISSION STATUS UNSPECIFIED (MULTI): ICD-10-CM

## 2025-03-20 DIAGNOSIS — G47.00 INSOMNIA, UNSPECIFIED TYPE: ICD-10-CM

## 2025-03-20 DIAGNOSIS — F51.01 PRIMARY INSOMNIA: ICD-10-CM

## 2025-03-20 PROCEDURE — 99213 OFFICE O/P EST LOW 20 MIN: CPT | Performed by: CLINICAL NURSE SPECIALIST

## 2025-03-20 RX ORDER — TRAZODONE HYDROCHLORIDE 100 MG/1
200 TABLET ORAL NIGHTLY
Qty: 180 TABLET | Refills: 1 | Status: SHIPPED | OUTPATIENT
Start: 2025-03-20 | End: 2025-06-18

## 2025-03-20 RX ORDER — BUPROPION HYDROCHLORIDE 150 MG/1
150 TABLET ORAL EVERY MORNING
Qty: 90 TABLET | Refills: 1 | Status: SHIPPED | OUTPATIENT
Start: 2025-03-20 | End: 2025-06-18

## 2025-03-20 RX ORDER — LURASIDONE HYDROCHLORIDE 120 MG/1
120 TABLET, FILM COATED ORAL DAILY
Qty: 90 TABLET | Refills: 1 | Status: SHIPPED | OUTPATIENT
Start: 2025-03-20 | End: 2025-06-18

## 2025-03-20 NOTE — PROGRESS NOTES
Outpatient Psychiatry      Subjective   Yamile Cook, a 54 y.o. female, for an audio and video virtual virtual appointment for follow up as an established patient   Patient is referred by Dione Chavarria MD .    Virtual or Telephone Consent    An interactive audio and video telecommunication system which permits real time communications between the patient (at the originating site) and provider (at the distant site) was utilized to provide this telehealth service.   Verbal consent was requested and obtained from Yamile Cook on this date, 03/20/25 for a telehealth visit and the patient's location was confirmed at the time of the visit.       Diagnoses :  Bipolar affective disorder , remission status unspecified stable F31.9  Recurrent major depression in remission F33.40 stable   Insomnia unspecified type stable G 47.00      Psychotropic medications :  can continue latuda 120 mg daily for mood stabilization,   can continue bupropion xl 150 mg , daily each morning , for depression   Continue trazodone 100 mg , 2 tablets at bedtime for sleep   can continue self care and wellness efforts and maintain routine health screenings , can call  for treatment and scheduling concerns. 4 months follow up per audio and video virtual [facility] my chart virtual appointment       Diagnosis:   Problem List         Patient Active Problem List   Diagnosis    Acute post-operative pain    Acute URI    Alteration in nutrition    Anxiety    Back pain    Bariatric surgery status    BPPV (benign paroxysmal positional vertigo)    Breast calcification, right    Candidiasis of vagina    Contusion of toe of right foot    Coronavirus infection    COVID-19    Bipolar affective disorder (Multi)    Depression    Dermatitis, eczematoid    Dysmetabolic syndrome    Esophageal reflux    Essential hypertension    Fall, accidental    Fever and chills    HSV-2 infection    Hyperlipidemia    Impaired fasting glucose    Insomnia    Intestinal  malabsorption following gastrectomy ([facility])    Lung nodule, solitary    Mammogram abnormal    Menorrhagia with irregular cycle    Migraines    Morbid obesity (Multi)    Nocturnal hypoxemia    Breathing-related sleep disorder    FRANCE (obstructive sleep apnea)    Painful urination    Pleurisy    Psychological factors affecting morbid obesity (Multi)    Restless leg syndrome    SOB (shortness of breath)    Sore throat    Strain of lumbar region    Strep tonsillitis    Thoracic radiculitis    Thoracic sprain    Vitamin D deficiency    Abnormal Pap smear of cervix    History of manic depressive disorder         Treatment Plan/Recommendations:   Follow-up plan was discussed with patient. See above assessment section of this note      Review with patient: Treatment plan reviewed with the patient.  Medication risks/benefit reviewed with the patient     HPI:  No side effects of psychotropic medications    She is on zepbound and is having some constipation and she says this is very manageable    Has good balance in her life with work and activities outside of work   Sleep is good   Mood is stable   No excessive anxiety   She has daily back pain and she takes a muscle relaxer and meloxicam as needed when it is worse , it is very painful this week , she is trying not to take anything but tylenol   No thoughts of self harm   No concerns with cognitive functioning   She has migraine headaches that she takes medications for and this has been managing this pretty well lately   She had covid at the end of December and she was tired afterwards but no complications      ros psych negative   Medical ros see above in this note       Patient Active Problem List   Diagnosis    Acute post-operative pain    Acute URI    Alteration in nutrition    Anxiety    Back pain    Bariatric surgery status    BPPV (benign paroxysmal positional vertigo)    Breast calcification, right    Candidiasis of vagina    Contusion of toe of right foot     Coronavirus infection    COVID-19    Bipolar affective disorder (Multi)    Depression    Dermatitis, eczematoid    Dysmetabolic syndrome    Esophageal reflux    Essential hypertension    Fall, accidental    Fever and chills    HSV-2 infection    Hyperlipidemia    Impaired fasting glucose    Insomnia    Intestinal malabsorption following gastrectomy ([facility])    Lung nodule, solitary    Mammogram abnormal    Menorrhagia with irregular cycle    Migraines    Morbid obesity (Multi)    Nocturnal hypoxemia    Breathing-related sleep disorder    FRANCE (obstructive sleep apnea)    Painful urination    Pleurisy    Psychological factors affecting morbid obesity (Multi)    Restless leg syndrome    SOB (shortness of breath)    Sore throat    Strain of lumbar region    Strep tonsillitis    Thoracic radiculitis    Thoracic sprain    Vitamin D deficiency    Abnormal Pap smear of cervix    History of manic depressive disorder     Current Outpatient Medications:     azelastine (Astelin) 137 mcg (0.1 %) nasal spray, Administer 1 spray into each nostril 2 times a day for 10 days. Use in each nostril as directed, Disp: 30 mL, Rfl: 0    biotin 2,500 mcg capsule, Take by mouth., Disp: , Rfl:     buPROPion XL (Wellbutrin XL) 150 mg 24 hr tablet, Take 1 tablet (150 mg) by mouth once daily in the morning. Do not crush, chew, or split., Disp: 90 tablet, Rfl: 0    cholecalciferol (Vitamin D-3) 125 MCG (5000 UT) capsule, Take by mouth., Disp: , Rfl:     cyanocobalamin (Vitamin B-12) 100 mcg tablet, Take by mouth., Disp: , Rfl:     lurasidone (Latuda) 120 mg tablet, Take 1 tablet (120 mg) by mouth once daily., Disp: 90 tablet, Rfl: 0    meloxicam (Mobic) 7.5 mg tablet, Take 1 tablet (7.5 mg) by mouth once daily. As needed For back pain, Disp: , Rfl:     multivitamin with minerals (Adults Multivitamin) tablet, Take by mouth., Disp: , Rfl:     omeprazole (PriLOSEC) 40 mg DR capsule, Take 1 capsule (40 mg) by mouth once daily., Disp: 90 capsule,  Rfl: 1    rizatriptan (Maxalt) 10 mg tablet, Take 1 tablet (10 mg) by mouth once daily as needed for migraine., Disp: 9 tablet, Rfl: 3    tiZANidine (Zanaflex) 4 mg capsule, Take 1 capsule (4 mg) by mouth as needed at bedtime for muscle spasms., Disp: 90 capsule, Rfl: 0    traZODone (Desyrel) 100 mg tablet, Take 2 tablets (200 mg) by mouth once daily at bedtime., Disp: 180 tablet, Rfl: 0    valACYclovir (Valtrex) 500 mg tablet, Take 1 tablet (500 mg) by mouth once daily., Disp: 90 tablet, Rfl: 3    Zepbound 5 mg/0.5 mL injection, INJECT 5 MG UNDER THE SKIN ONCE WEEKLY FOR 4 WEEKS, Disp: , Rfl:   Medical History:  Past Medical History:   Diagnosis Date    Anxiety     Arthritis     Depression     GERD (gastroesophageal reflux disease)     Headache     Scoliosis     Urinary tract infection     Varicella      Surgical History:  Past Surgical History:   Procedure Laterality Date    BREAST BIOPSY Right 09/05/2019    GASTRIC BYPASS      MULTIPLE TOOTH EXTRACTIONS      single     Family History:  Family History   Problem Relation Name Age of Onset    Breast cancer Mother Yandy mccullough     Pancreatic cancer Mother Yandy mccullough     Depression Mother Yandy mccullough     Arthritis Mother Yandy mccullough     Blood clot Mother Yandy mccullough      Social History:  Social History     Socioeconomic History    Marital status: Single     Spouse name: Not on file    Number of children: Not on file    Years of education: Not on file    Highest education level: Not on file   Occupational History    Not on file   Tobacco Use    Smoking status: Former     Current packs/day: 0.00     Types: Cigarettes    Smokeless tobacco: Never   Substance and Sexual Activity    Alcohol use: Yes     Alcohol/week: 1.0 standard drink of alcohol     Types: 1 Standard drinks or equivalent per week    Drug use: Never    Sexual activity: Yes     Partners: Male     Birth control/protection: Condom Male   Other Topics Concern    Not on file   Social History Narrative     Not on file     Social Drivers of Health     Financial Resource Strain: Not on file   Food Insecurity: Not on file   Transportation Needs: Not on file   Physical Activity: Not on file   Stress: Not on file   Social Connections: Not on file   Intimate Partner Violence: Not on file   Housing Stability: Not on file     Vitals:  There were no vitals filed for this visit.    Ceci Berry, APRN-CNS

## 2025-03-25 DIAGNOSIS — Z12.11 COLON CANCER SCREENING: ICD-10-CM

## 2025-03-25 RX ORDER — POLYETHYLENE GLYCOL 3350, SODIUM SULFATE ANHYDROUS, SODIUM BICARBONATE, SODIUM CHLORIDE, POTASSIUM CHLORIDE 236; 22.74; 6.74; 5.86; 2.97 G/4L; G/4L; G/4L; G/4L; G/4L
POWDER, FOR SOLUTION ORAL
Qty: 4000 ML | Refills: 0 | Status: SHIPPED | OUTPATIENT
Start: 2025-03-25

## 2025-03-26 ENCOUNTER — APPOINTMENT (OUTPATIENT)
Dept: BEHAVIORAL HEALTH | Facility: CLINIC | Age: 55
End: 2025-03-26
Payer: COMMERCIAL

## 2025-05-10 ENCOUNTER — APPOINTMENT (OUTPATIENT)
Dept: BEHAVIORAL HEALTH | Facility: CLINIC | Age: 55
End: 2025-05-10
Payer: COMMERCIAL

## 2025-05-10 DIAGNOSIS — F41.9 ANXIETY AND DEPRESSION: ICD-10-CM

## 2025-05-10 DIAGNOSIS — F32.A ANXIETY AND DEPRESSION: ICD-10-CM

## 2025-05-10 PROCEDURE — 90834 PSYTX W PT 45 MINUTES: CPT | Performed by: PSYCHOLOGIST

## 2025-05-10 NOTE — PROGRESS NOTES
2 PM [address] [facility] for Depression and Anxiety (dx'ed  Bipolar in past) (45 MIN, 123-201). Virtual. Supportive Therapy.  Patient last seen in September, 2024.  Patient has been working as a  for [employer] for the past 7 months.  Is liking the job but finding it challenging.  She does [employer] and has a team of 5 that answers to her.  She likes her boss and the supervisor above her boss.  Had 1 difficult employee but the problems resolved and the employee quit interviewing for position at this time.  Patient's mother had a heart attack 3 weeks ago.  Patient's mother was in a rehab facility for a period of time.  Some conflict with brother because mother lied to him about patient's viewpoint on her being in a rehab facility.  Patient supported mother going to rehab facility but was told she did not support it.  Getting some renovations and worked on the [employer].  Patient has been dating someone since the end of January.  Person lives in [place] is a .  Relationship had been going well until recently when he was working more overtime and patient has felt that she is not a priority to him.  She will try to clarify this today since they are having a day, going to the movies.  She has been in previous relationships where she does not feel like she was a priority.  Continues to work on financial goals.  However overspent money and took out a few payday loans.  Try to get finances back under control.  New job pay substantially more than her old position so she is making enough money to manage finances but has made some poor choices. Next: 2-3 weeks.

## 2025-05-27 ENCOUNTER — APPOINTMENT (OUTPATIENT)
Dept: BEHAVIORAL HEALTH | Facility: CLINIC | Age: 55
End: 2025-05-27
Payer: COMMERCIAL

## 2025-05-27 DIAGNOSIS — F32.A ANXIETY AND DEPRESSION: ICD-10-CM

## 2025-05-27 DIAGNOSIS — F41.9 ANXIETY AND DEPRESSION: ICD-10-CM

## 2025-05-27 PROCEDURE — 90832 PSYTX W PT 30 MINUTES: CPT | Performed by: PSYCHOLOGIST

## 2025-05-27 NOTE — PROGRESS NOTES
10 AM [address] [facility] Psych for Depression and Anxiety (dx'ed  Bipolar in past) (42, 2824-4008). Virtual. Supportive Therapy.  Mood stable.  Patient was late for meeting today.  Patient reported she did not get a reminder and forgot about appointment.  On vacation this week.  Spent most the time today discussing her frustration in her relationship with Simeon.  Dating the past 4 months but because he is working so much overtime and attending to his 9-year-old son, she has not had a chance to see him.  She is trying to make a decision about whether or not to stay in the relationship.  She indicated that she will give it another month. Next: 2 weeks.

## 2025-05-30 ENCOUNTER — APPOINTMENT (OUTPATIENT)
Dept: GASTROENTEROLOGY | Facility: EXTERNAL LOCATION | Age: 55
End: 2025-05-30
Payer: COMMERCIAL

## 2025-05-30 DIAGNOSIS — Z12.11 ENCOUNTER FOR SCREENING FOR MALIGNANT NEOPLASM OF COLON: ICD-10-CM

## 2025-05-30 DIAGNOSIS — Z12.11 ENCOUNTER FOR SCREENING FOR MALIGNANT NEOPLASM OF COLON: Primary | ICD-10-CM

## 2025-05-30 DIAGNOSIS — D12.4 BENIGN NEOPLASM OF DESCENDING COLON: ICD-10-CM

## 2025-05-30 DIAGNOSIS — D12.2 BENIGN NEOPLASM OF ASCENDING COLON: ICD-10-CM

## 2025-05-30 DIAGNOSIS — Z86.0101 HISTORY OF ADENOMATOUS POLYP OF COLON: ICD-10-CM

## 2025-05-30 PROCEDURE — 45385 COLONOSCOPY W/LESION REMOVAL: CPT | Performed by: INTERNAL MEDICINE

## 2025-05-31 ENCOUNTER — LAB REQUISITION (OUTPATIENT)
Dept: LAB | Facility: HOSPITAL | Age: 55
End: 2025-05-31
Payer: COMMERCIAL

## 2025-06-13 ENCOUNTER — APPOINTMENT (OUTPATIENT)
Dept: BEHAVIORAL HEALTH | Facility: CLINIC | Age: 55
End: 2025-06-13
Payer: COMMERCIAL

## 2025-06-13 LAB
LABORATORY COMMENT REPORT: NORMAL
PATH REPORT.FINAL DX SPEC: NORMAL
PATH REPORT.GROSS SPEC: NORMAL
PATH REPORT.RELEVANT HX SPEC: NORMAL
PATH REPORT.TOTAL CANCER: NORMAL

## 2025-06-17 ENCOUNTER — APPOINTMENT (OUTPATIENT)
Dept: BEHAVIORAL HEALTH | Facility: CLINIC | Age: 55
End: 2025-06-17
Payer: COMMERCIAL

## 2025-07-30 ENCOUNTER — OFFICE VISIT (OUTPATIENT)
Dept: URGENT CARE | Age: 55
End: 2025-07-30
Payer: COMMERCIAL

## 2025-07-30 DIAGNOSIS — H81.399 PERIPHERAL VERTIGO, UNSPECIFIED LATERALITY: Primary | ICD-10-CM

## 2025-07-30 DIAGNOSIS — R51.9 NONINTRACTABLE HEADACHE, UNSPECIFIED CHRONICITY PATTERN, UNSPECIFIED HEADACHE TYPE: ICD-10-CM

## 2025-07-30 RX ORDER — MECLIZINE HYDROCHLORIDE 25 MG/1
25 TABLET ORAL 3 TIMES DAILY PRN
Qty: 30 TABLET | Refills: 0 | Status: SHIPPED | OUTPATIENT
Start: 2025-07-30 | End: 2025-08-09

## 2025-07-30 RX ORDER — DIPHENHYDRAMINE HCL 25 MG
25 CAPSULE ORAL 2 TIMES DAILY
Qty: 14 CAPSULE | Refills: 0 | Status: SHIPPED | OUTPATIENT
Start: 2025-07-30 | End: 2025-08-06

## 2025-07-30 RX ORDER — METOCLOPRAMIDE 10 MG/1
10 TABLET ORAL 2 TIMES DAILY
Qty: 14 TABLET | Refills: 0 | Status: SHIPPED | OUTPATIENT
Start: 2025-07-30 | End: 2025-08-06

## 2025-07-30 NOTE — PROGRESS NOTES
Urgent Care Virtual Video Visit    Patient Location: [place]  Provider Location: [facility]    54-year-old female with a history of vertigo and a history of headaches presents today with vertigo and headache.  She denies any neurologic deficit.  She states that she can stand but she has to be very careful.  She had a history of vertigo and meclizine worked.  They told her that she had crystals that were not balance in her ears.  She presently rates her headache 3 out of 10.  She has a long history of migraine headaches.  She usually takes Maxalt for headaches.  She states that she is only experiencing slight nausea and denies vomiting.  She denies fever or chills.  She denies pharyngitis.  She denies chest pain or dyspnea.  Denies palpitations.  She endorses polyuria but she has experienced polyuria for years.  She denies melena, hematochezia, or hematuria.  She denies any trauma or fall.  She denies vision change.  She denies unilateral weakness.  She denies facial droop.  She had a negative MRI of her head for her headaches.  She has no other other cause for concern or complaint.    We decided to reinstate patient's meclizine and meclizine was sent to her pharmacy.  I recommend that someone else drive and pick them medication up for patient.  She can use Reglan and Benadryl for headache as needed.  She understands that if symptoms worsen or she develops inability to ambulate she needs to immediately call 911 and go to the local emergency department for head CT and workup.    I have communicated my name and active licensure. Video visit completed with realtime synchronous video/audio connection. Informed consent was obtained from the patient. Patient was made aware that my evaluation and diagnosis are limited due to the fact that we are not in the same room during the interview and that this is a virtual encounter that took place via videoconferencing. Patient verbalized understanding.     Patient  disposition: Home    Electronically signed by DWAYNE Chapman  4:15 PM

## 2025-09-03 ENCOUNTER — APPOINTMENT (OUTPATIENT)
Dept: PRIMARY CARE | Facility: CLINIC | Age: 55
End: 2025-09-03
Payer: COMMERCIAL

## 2025-09-03 ASSESSMENT — PATIENT HEALTH QUESTIONNAIRE - PHQ9
SUM OF ALL RESPONSES TO PHQ9 QUESTIONS 1 AND 2: 0
1. LITTLE INTEREST OR PLEASURE IN DOING THINGS: NOT AT ALL
2. FEELING DOWN, DEPRESSED OR HOPELESS: NOT AT ALL

## 2025-09-05 ENCOUNTER — RESULTS FOLLOW-UP (OUTPATIENT)
Dept: PRIMARY CARE | Facility: CLINIC | Age: 55
End: 2025-09-05
Payer: COMMERCIAL

## 2025-09-05 DIAGNOSIS — D75.89 MACROCYTOSIS: Primary | ICD-10-CM

## 2025-09-05 LAB
ALBUMIN SERPL-MCNC: 4.1 G/DL (ref 3.6–5.1)
ALP SERPL-CCNC: 41 U/L (ref 37–153)
ALT SERPL-CCNC: 13 U/L (ref 6–29)
ANION GAP SERPL CALCULATED.4IONS-SCNC: 8 MMOL/L (CALC) (ref 7–17)
APPEARANCE UR: ABNORMAL
AST SERPL-CCNC: 14 U/L (ref 10–35)
BACTERIA #/AREA URNS HPF: ABNORMAL /HPF
BILIRUB SERPL-MCNC: 0.5 MG/DL (ref 0.2–1.2)
BILIRUB UR QL STRIP: NEGATIVE
BUN SERPL-MCNC: 12 MG/DL (ref 7–25)
CALCIUM SERPL-MCNC: 10.1 MG/DL (ref 8.6–10.4)
CAOX CRY #/AREA URNS HPF: ABNORMAL /HPF
CHLORIDE SERPL-SCNC: 103 MMOL/L (ref 98–110)
CHOLEST SERPL-MCNC: 152 MG/DL
CHOLEST/HDLC SERPL: 3 (CALC)
CO2 SERPL-SCNC: 28 MMOL/L (ref 20–32)
COLOR UR: ABNORMAL
CREAT SERPL-MCNC: 0.51 MG/DL (ref 0.5–1.03)
EGFRCR SERPLBLD CKD-EPI 2021: 109 ML/MIN/1.73M2
ERYTHROCYTE [DISTWIDTH] IN BLOOD BY AUTOMATED COUNT: 14.9 % (ref 11–15)
EST. AVERAGE GLUCOSE BLD GHB EST-MCNC: 82 MG/DL
EST. AVERAGE GLUCOSE BLD GHB EST-SCNC: 4.6 MMOL/L
GLUCOSE SERPL-MCNC: 72 MG/DL (ref 65–99)
GLUCOSE UR QL STRIP: NEGATIVE
HBA1C MFR BLD: 4.5 %
HCT VFR BLD AUTO: 43 % (ref 35–45)
HCV AB SERPL QL IA: NORMAL
HDLC SERPL-MCNC: 51 MG/DL
HGB BLD-MCNC: 13.3 G/DL (ref 11.7–15.5)
HGB UR QL STRIP: NEGATIVE
HYALINE CASTS #/AREA URNS LPF: ABNORMAL /LPF
KETONES UR QL STRIP: ABNORMAL
LDLC SERPL CALC-MCNC: 82 MG/DL (CALC)
LEUKOCYTE ESTERASE UR QL STRIP: ABNORMAL
MCH RBC QN AUTO: 31.4 PG (ref 27–33)
MCHC RBC AUTO-ENTMCNC: 30.9 G/DL (ref 32–36)
MCV RBC AUTO: 101.4 FL (ref 80–100)
NITRITE UR QL STRIP: NEGATIVE
NONHDLC SERPL-MCNC: 101 MG/DL (CALC)
PH UR STRIP: 5.5 [PH] (ref 5–8)
PLATELET # BLD AUTO: 373 THOUSAND/UL (ref 140–400)
PMV BLD REES-ECKER: 9.1 FL (ref 7.5–12.5)
POTASSIUM SERPL-SCNC: 3.9 MMOL/L (ref 3.5–5.3)
PROT SERPL-MCNC: 6.7 G/DL (ref 6.1–8.1)
PROT UR QL STRIP: ABNORMAL
RBC # BLD AUTO: 4.24 MILLION/UL (ref 3.8–5.1)
RBC #/AREA URNS HPF: ABNORMAL /HPF
SERVICE CMNT-IMP: ABNORMAL
SODIUM SERPL-SCNC: 139 MMOL/L (ref 135–146)
SP GR UR STRIP: 1.03 (ref 1–1.03)
SQUAMOUS #/AREA URNS HPF: ABNORMAL /HPF
TRIGL SERPL-MCNC: 92 MG/DL
WBC # BLD AUTO: 8.5 THOUSAND/UL (ref 3.8–10.8)
WBC #/AREA URNS HPF: ABNORMAL /HPF

## 2026-03-03 ENCOUNTER — APPOINTMENT (OUTPATIENT)
Dept: PRIMARY CARE | Facility: CLINIC | Age: 56
End: 2026-03-03
Payer: COMMERCIAL